# Patient Record
Sex: MALE | Race: WHITE | Employment: FULL TIME | URBAN - METROPOLITAN AREA
[De-identification: names, ages, dates, MRNs, and addresses within clinical notes are randomized per-mention and may not be internally consistent; named-entity substitution may affect disease eponyms.]

---

## 2018-12-28 ENCOUNTER — HOSPITAL ENCOUNTER (INPATIENT)
Age: 38
LOS: 1 days | Discharge: HOME | DRG: 247 | End: 2018-12-29
Attending: EMERGENCY MEDICINE | Admitting: INTERNAL MEDICINE
Payer: SUBSIDIZED

## 2018-12-28 ENCOUNTER — APPOINTMENT (OUTPATIENT)
Dept: GENERAL RADIOLOGY | Age: 38
DRG: 247 | End: 2018-12-28
Attending: EMERGENCY MEDICINE
Payer: SUBSIDIZED

## 2018-12-28 DIAGNOSIS — R07.9 ACUTE CHEST PAIN: Primary | ICD-10-CM

## 2018-12-28 PROBLEM — I21.4 NSTEMI (NON-ST ELEVATED MYOCARDIAL INFARCTION) (HCC): Status: ACTIVE | Noted: 2018-12-28

## 2018-12-28 PROBLEM — I10 HTN (HYPERTENSION): Status: ACTIVE | Noted: 2018-12-28

## 2018-12-28 PROBLEM — F19.20 SUBSTANCE DEPENDENCY (HCC): Status: ACTIVE | Noted: 2018-12-28

## 2018-12-28 LAB
ACT BLD: 230 SECS (ref 70–128)
ACT BLD: 257 SECS (ref 70–128)
ALBUMIN SERPL-MCNC: 3.9 G/DL (ref 3.5–5)
ALBUMIN/GLOB SERPL: 1 {RATIO} (ref 1.2–3.5)
ALP SERPL-CCNC: 69 U/L (ref 50–136)
ALT SERPL-CCNC: 33 U/L (ref 12–65)
AMPHET UR QL SCN: NEGATIVE
ANION GAP SERPL CALC-SCNC: 9 MMOL/L (ref 7–16)
APTT PPP: 27 SEC (ref 24.7–39.8)
AST SERPL-CCNC: 26 U/L (ref 15–37)
ATRIAL RATE: 67 BPM
ATRIAL RATE: 83 BPM
BARBITURATES UR QL SCN: NEGATIVE
BASOPHILS # BLD: 0 K/UL (ref 0–0.2)
BASOPHILS NFR BLD: 1 % (ref 0–2)
BENZODIAZ UR QL: POSITIVE
BILIRUB SERPL-MCNC: 0.5 MG/DL (ref 0.2–1.1)
BUN SERPL-MCNC: 13 MG/DL (ref 6–23)
CALCIUM SERPL-MCNC: 8.8 MG/DL (ref 8.3–10.4)
CALCULATED P AXIS, ECG09: 50 DEGREES
CALCULATED P AXIS, ECG09: 63 DEGREES
CALCULATED R AXIS, ECG10: 25 DEGREES
CALCULATED R AXIS, ECG10: 26 DEGREES
CALCULATED T AXIS, ECG11: 22 DEGREES
CALCULATED T AXIS, ECG11: 43 DEGREES
CANNABINOIDS UR QL SCN: POSITIVE
CHLORIDE SERPL-SCNC: 109 MMOL/L (ref 98–107)
CO2 SERPL-SCNC: 26 MMOL/L (ref 21–32)
COCAINE UR QL SCN: NEGATIVE
CREAT SERPL-MCNC: 0.94 MG/DL (ref 0.8–1.5)
DIAGNOSIS, 93000: NORMAL
DIAGNOSIS, 93000: NORMAL
DIFFERENTIAL METHOD BLD: ABNORMAL
EOSINOPHIL # BLD: 0.1 K/UL (ref 0–0.8)
EOSINOPHIL NFR BLD: 1 % (ref 0.5–7.8)
ERYTHROCYTE [DISTWIDTH] IN BLOOD BY AUTOMATED COUNT: 11.9 % (ref 11.9–14.6)
GLOBULIN SER CALC-MCNC: 3.8 G/DL (ref 2.3–3.5)
GLUCOSE SERPL-MCNC: 108 MG/DL (ref 65–100)
HCT VFR BLD AUTO: 49.5 % (ref 41.1–50.3)
HGB BLD-MCNC: 16.7 G/DL (ref 13.6–17.2)
IMM GRANULOCYTES # BLD: 0 K/UL (ref 0–0.5)
IMM GRANULOCYTES NFR BLD AUTO: 1 % (ref 0–5)
LYMPHOCYTES # BLD: 3.8 K/UL (ref 0.5–4.6)
LYMPHOCYTES NFR BLD: 43 % (ref 13–44)
MCH RBC QN AUTO: 31.7 PG (ref 26.1–32.9)
MCHC RBC AUTO-ENTMCNC: 33.7 G/DL (ref 31.4–35)
MCV RBC AUTO: 94.1 FL (ref 79.6–97.8)
METHADONE UR QL: NEGATIVE
MONOCYTES # BLD: 0.8 K/UL (ref 0.1–1.3)
MONOCYTES NFR BLD: 9 % (ref 4–12)
NEUTS SEG # BLD: 4 K/UL (ref 1.7–8.2)
NEUTS SEG NFR BLD: 46 % (ref 43–78)
NRBC # BLD: 0 K/UL (ref 0–0.2)
OPIATES UR QL: POSITIVE
P-R INTERVAL, ECG05: 154 MS
P-R INTERVAL, ECG05: 158 MS
PCP UR QL: NEGATIVE
PLATELET # BLD AUTO: 202 K/UL (ref 150–450)
PMV BLD AUTO: 8.8 FL (ref 9.4–12.3)
POTASSIUM SERPL-SCNC: 3.3 MMOL/L (ref 3.5–5.1)
PROT SERPL-MCNC: 7.7 G/DL (ref 6.3–8.2)
Q-T INTERVAL, ECG07: 356 MS
Q-T INTERVAL, ECG07: 374 MS
QRS DURATION, ECG06: 86 MS
QRS DURATION, ECG06: 88 MS
QTC CALCULATION (BEZET), ECG08: 395 MS
QTC CALCULATION (BEZET), ECG08: 418 MS
RBC # BLD AUTO: 5.26 M/UL (ref 4.23–5.6)
SODIUM SERPL-SCNC: 144 MMOL/L (ref 136–145)
TROPONIN I BLD-MCNC: 0.8 NG/ML (ref 0.02–0.05)
TROPONIN I SERPL-MCNC: 1.75 NG/ML (ref 0.02–0.05)
TROPONIN I SERPL-MCNC: 5.13 NG/ML (ref 0.02–0.05)
VENTRICULAR RATE, ECG03: 67 BPM
VENTRICULAR RATE, ECG03: 83 BPM
WBC # BLD AUTO: 8.7 K/UL (ref 4.3–11.1)

## 2018-12-28 PROCEDURE — 74011250637 HC RX REV CODE- 250/637: Performed by: INTERNAL MEDICINE

## 2018-12-28 PROCEDURE — 74011250637 HC RX REV CODE- 250/637: Performed by: EMERGENCY MEDICINE

## 2018-12-28 PROCEDURE — 71045 X-RAY EXAM CHEST 1 VIEW: CPT

## 2018-12-28 PROCEDURE — 84484 ASSAY OF TROPONIN QUANT: CPT

## 2018-12-28 PROCEDURE — 80307 DRUG TEST PRSMV CHEM ANLYZR: CPT

## 2018-12-28 PROCEDURE — 027034Z DILATION OF CORONARY ARTERY, ONE ARTERY WITH DRUG-ELUTING INTRALUMINAL DEVICE, PERCUTANEOUS APPROACH: ICD-10-PCS | Performed by: INTERNAL MEDICINE

## 2018-12-28 PROCEDURE — 92928 PRQ TCAT PLMT NTRAC ST 1 LES: CPT

## 2018-12-28 PROCEDURE — 74011250636 HC RX REV CODE- 250/636: Performed by: NURSE PRACTITIONER

## 2018-12-28 PROCEDURE — 65660000000 HC RM CCU STEPDOWN

## 2018-12-28 PROCEDURE — 85025 COMPLETE CBC W/AUTO DIFF WBC: CPT

## 2018-12-28 PROCEDURE — B2101ZZ FLUOROSCOPY OF SINGLE CORONARY ARTERY USING LOW OSMOLAR CONTRAST: ICD-10-PCS | Performed by: INTERNAL MEDICINE

## 2018-12-28 PROCEDURE — 93005 ELECTROCARDIOGRAM TRACING: CPT | Performed by: INTERNAL MEDICINE

## 2018-12-28 PROCEDURE — 92978 ENDOLUMINL IVUS OCT C 1ST: CPT

## 2018-12-28 PROCEDURE — 93458 L HRT ARTERY/VENTRICLE ANGIO: CPT

## 2018-12-28 PROCEDURE — 74011000250 HC RX REV CODE- 250: Performed by: INTERNAL MEDICINE

## 2018-12-28 PROCEDURE — C8929 TTE W OR WO FOL WCON,DOPPLER: HCPCS

## 2018-12-28 PROCEDURE — 80053 COMPREHEN METABOLIC PANEL: CPT

## 2018-12-28 PROCEDURE — 74011636320 HC RX REV CODE- 636/320: Performed by: INTERNAL MEDICINE

## 2018-12-28 PROCEDURE — 85347 COAGULATION TIME ACTIVATED: CPT

## 2018-12-28 PROCEDURE — 36415 COLL VENOUS BLD VENIPUNCTURE: CPT

## 2018-12-28 PROCEDURE — 99285 EMERGENCY DEPT VISIT HI MDM: CPT | Performed by: EMERGENCY MEDICINE

## 2018-12-28 PROCEDURE — 99153 MOD SED SAME PHYS/QHP EA: CPT

## 2018-12-28 PROCEDURE — 93005 ELECTROCARDIOGRAM TRACING: CPT | Performed by: EMERGENCY MEDICINE

## 2018-12-28 PROCEDURE — 74011250637 HC RX REV CODE- 250/637: Performed by: NURSE PRACTITIONER

## 2018-12-28 PROCEDURE — 85730 THROMBOPLASTIN TIME PARTIAL: CPT

## 2018-12-28 PROCEDURE — 74011250636 HC RX REV CODE- 250/636: Performed by: INTERNAL MEDICINE

## 2018-12-28 PROCEDURE — 4A023N7 MEASUREMENT OF CARDIAC SAMPLING AND PRESSURE, LEFT HEART, PERCUTANEOUS APPROACH: ICD-10-PCS | Performed by: INTERNAL MEDICINE

## 2018-12-28 PROCEDURE — 99152 MOD SED SAME PHYS/QHP 5/>YRS: CPT

## 2018-12-28 PROCEDURE — B240ZZ3 ULTRASONOGRAPHY OF SINGLE CORONARY ARTERY, INTRAVASCULAR: ICD-10-PCS | Performed by: INTERNAL MEDICINE

## 2018-12-28 PROCEDURE — 74011250636 HC RX REV CODE- 250/636

## 2018-12-28 PROCEDURE — B2151ZZ FLUOROSCOPY OF LEFT HEART USING LOW OSMOLAR CONTRAST: ICD-10-PCS | Performed by: INTERNAL MEDICINE

## 2018-12-28 RX ORDER — MORPHINE SULFATE 2 MG/ML
2 INJECTION, SOLUTION INTRAMUSCULAR; INTRAVENOUS
Status: DISCONTINUED | OUTPATIENT
Start: 2018-12-28 | End: 2018-12-29 | Stop reason: HOSPADM

## 2018-12-28 RX ORDER — MIDAZOLAM HYDROCHLORIDE 1 MG/ML
.5-2 INJECTION, SOLUTION INTRAMUSCULAR; INTRAVENOUS
Status: DISCONTINUED | OUTPATIENT
Start: 2018-12-28 | End: 2018-12-29 | Stop reason: HOSPADM

## 2018-12-28 RX ORDER — SODIUM CHLORIDE 9 MG/ML
100 INJECTION, SOLUTION INTRAVENOUS CONTINUOUS
Status: DISCONTINUED | OUTPATIENT
Start: 2018-12-28 | End: 2018-12-29 | Stop reason: HOSPADM

## 2018-12-28 RX ORDER — ACETAMINOPHEN 500 MG
1000 TABLET ORAL
Status: COMPLETED | OUTPATIENT
Start: 2018-12-28 | End: 2018-12-28

## 2018-12-28 RX ORDER — LISINOPRIL 5 MG/1
2.5 TABLET ORAL DAILY
Status: DISCONTINUED | OUTPATIENT
Start: 2018-12-28 | End: 2018-12-29 | Stop reason: HOSPADM

## 2018-12-28 RX ORDER — GUAIFENESIN 100 MG/5ML
81 LIQUID (ML) ORAL DAILY
Status: DISCONTINUED | OUTPATIENT
Start: 2018-12-29 | End: 2018-12-29 | Stop reason: HOSPADM

## 2018-12-28 RX ORDER — HEPARIN SODIUM 200 [USP'U]/100ML
2 INJECTION, SOLUTION INTRAVENOUS CONTINUOUS
Status: DISCONTINUED | OUTPATIENT
Start: 2018-12-28 | End: 2018-12-29 | Stop reason: HOSPADM

## 2018-12-28 RX ORDER — HEPARIN SODIUM 5000 [USP'U]/ML
4000 INJECTION, SOLUTION INTRAVENOUS; SUBCUTANEOUS ONCE
Status: COMPLETED | OUTPATIENT
Start: 2018-12-28 | End: 2018-12-28

## 2018-12-28 RX ORDER — HEPARIN SODIUM 10000 [USP'U]/ML
1000-9000 INJECTION, SOLUTION INTRAVENOUS; SUBCUTANEOUS
Status: DISCONTINUED | OUTPATIENT
Start: 2018-12-28 | End: 2018-12-29 | Stop reason: HOSPADM

## 2018-12-28 RX ORDER — FENTANYL CITRATE 50 UG/ML
25-100 INJECTION, SOLUTION INTRAMUSCULAR; INTRAVENOUS
Status: DISCONTINUED | OUTPATIENT
Start: 2018-12-28 | End: 2018-12-29 | Stop reason: HOSPADM

## 2018-12-28 RX ORDER — HEPARIN SODIUM 5000 [USP'U]/100ML
12-25 INJECTION, SOLUTION INTRAVENOUS
Status: DISCONTINUED | OUTPATIENT
Start: 2018-12-28 | End: 2018-12-29 | Stop reason: HOSPADM

## 2018-12-28 RX ORDER — ACETAMINOPHEN 325 MG/1
650 TABLET ORAL
Status: DISCONTINUED | OUTPATIENT
Start: 2018-12-28 | End: 2018-12-29 | Stop reason: HOSPADM

## 2018-12-28 RX ORDER — LIDOCAINE HYDROCHLORIDE 10 MG/ML
3-10 INJECTION INFILTRATION; PERINEURAL
Status: DISCONTINUED | OUTPATIENT
Start: 2018-12-28 | End: 2018-12-29 | Stop reason: HOSPADM

## 2018-12-28 RX ORDER — SODIUM CHLORIDE 0.9 % (FLUSH) 0.9 %
5-10 SYRINGE (ML) INJECTION AS NEEDED
Status: DISCONTINUED | OUTPATIENT
Start: 2018-12-28 | End: 2018-12-29 | Stop reason: HOSPADM

## 2018-12-28 RX ORDER — NITROGLYCERIN 0.4 MG/1
0.4 TABLET SUBLINGUAL
Status: DISCONTINUED | OUTPATIENT
Start: 2018-12-28 | End: 2018-12-28

## 2018-12-28 RX ORDER — ATORVASTATIN CALCIUM 40 MG/1
40 TABLET, FILM COATED ORAL
Status: DISCONTINUED | OUTPATIENT
Start: 2018-12-28 | End: 2018-12-29 | Stop reason: HOSPADM

## 2018-12-28 RX ORDER — HYDROMORPHONE HYDROCHLORIDE 2 MG/ML
1 INJECTION, SOLUTION INTRAMUSCULAR; INTRAVENOUS; SUBCUTANEOUS ONCE
Status: COMPLETED | OUTPATIENT
Start: 2018-12-28 | End: 2018-12-28

## 2018-12-28 RX ORDER — HYDROCODONE BITARTRATE AND ACETAMINOPHEN 5; 325 MG/1; MG/1
1 TABLET ORAL
Status: DISCONTINUED | OUTPATIENT
Start: 2018-12-28 | End: 2018-12-29 | Stop reason: HOSPADM

## 2018-12-28 RX ORDER — ASPIRIN 325 MG
325 TABLET ORAL
Status: COMPLETED | OUTPATIENT
Start: 2018-12-28 | End: 2018-12-28

## 2018-12-28 RX ORDER — NITROGLYCERIN 0.4 MG/1
0.4 TABLET SUBLINGUAL
Status: DISCONTINUED | OUTPATIENT
Start: 2018-12-28 | End: 2018-12-29 | Stop reason: HOSPADM

## 2018-12-28 RX ADMIN — HEPARIN SODIUM AND DEXTROSE 12 UNITS/KG/HR: 5000; 5 INJECTION INTRAVENOUS at 10:20

## 2018-12-28 RX ADMIN — HEPARIN SODIUM 2000 UNITS: 10000 INJECTION INTRAVENOUS; SUBCUTANEOUS at 15:29

## 2018-12-28 RX ADMIN — ASPIRIN 325 MG: 325 TABLET ORAL at 09:03

## 2018-12-28 RX ADMIN — TICAGRELOR 180 MG: 90 TABLET ORAL at 15:36

## 2018-12-28 RX ADMIN — SODIUM CHLORIDE 100 ML/HR: 900 INJECTION, SOLUTION INTRAVENOUS at 12:14

## 2018-12-28 RX ADMIN — IOPAMIDOL 230 ML: 755 INJECTION, SOLUTION INTRAVENOUS at 15:36

## 2018-12-28 RX ADMIN — TIROFIBAN 2925 MCG: 3.75 INJECTION, SOLUTION INTRAVENOUS at 14:49

## 2018-12-28 RX ADMIN — HEPARIN SODIUM 4000 UNITS: 10000 INJECTION INTRAVENOUS; SUBCUTANEOUS at 14:46

## 2018-12-28 RX ADMIN — LISINOPRIL 2.5 MG: 5 TABLET ORAL at 12:14

## 2018-12-28 RX ADMIN — NITROGLYCERIN 0.4 MG: 0.4 TABLET, ORALLY DISINTEGRATING SUBLINGUAL at 09:28

## 2018-12-28 RX ADMIN — ACETAMINOPHEN 650 MG: 325 TABLET ORAL at 23:35

## 2018-12-28 RX ADMIN — MIDAZOLAM HYDROCHLORIDE 2 MG: 1 INJECTION, SOLUTION INTRAMUSCULAR; INTRAVENOUS at 14:37

## 2018-12-28 RX ADMIN — HYDROMORPHONE HYDROCHLORIDE 1 MG: 2 INJECTION, SOLUTION INTRAMUSCULAR; INTRAVENOUS; SUBCUTANEOUS at 15:19

## 2018-12-28 RX ADMIN — ATORVASTATIN CALCIUM 40 MG: 40 TABLET, FILM COATED ORAL at 21:29

## 2018-12-28 RX ADMIN — MIDAZOLAM HYDROCHLORIDE 2 MG: 1 INJECTION, SOLUTION INTRAMUSCULAR; INTRAVENOUS at 14:41

## 2018-12-28 RX ADMIN — ACETAMINOPHEN 1000 MG: 500 TABLET, FILM COATED ORAL at 09:33

## 2018-12-28 RX ADMIN — HEPARIN SODIUM 2 UNITS/HR: 5000 INJECTION, SOLUTION INTRAVENOUS; SUBCUTANEOUS at 14:16

## 2018-12-28 RX ADMIN — TIROFIBAN 0.15 MCG/KG/MIN: 5 INJECTION, SOLUTION INTRAVENOUS at 14:50

## 2018-12-28 RX ADMIN — HEPARIN SODIUM 4000 UNITS: 5000 INJECTION INTRAVENOUS; SUBCUTANEOUS at 10:19

## 2018-12-28 RX ADMIN — MIDAZOLAM HYDROCHLORIDE 2 MG: 1 INJECTION, SOLUTION INTRAMUSCULAR; INTRAVENOUS at 14:56

## 2018-12-28 RX ADMIN — HEPARIN SODIUM 2 ML: 10000 INJECTION INTRAVENOUS; SUBCUTANEOUS at 14:41

## 2018-12-28 RX ADMIN — MIDAZOLAM HYDROCHLORIDE 2 MG: 1 INJECTION, SOLUTION INTRAMUSCULAR; INTRAVENOUS at 15:13

## 2018-12-28 RX ADMIN — NITROGLYCERIN 1 INCH: 20 OINTMENT TOPICAL at 12:14

## 2018-12-28 RX ADMIN — LIDOCAINE HYDROCHLORIDE 4 ML: 10 INJECTION, SOLUTION INFILTRATION; PERINEURAL at 14:41

## 2018-12-28 RX ADMIN — NITROGLYCERIN 0.4 MG: 0.4 TABLET, ORALLY DISINTEGRATING SUBLINGUAL at 09:03

## 2018-12-28 RX ADMIN — FENTANYL CITRATE 50 MCG: 50 INJECTION, SOLUTION INTRAMUSCULAR; INTRAVENOUS at 15:17

## 2018-12-28 RX ADMIN — PERFLUTREN 1 ML: 6.52 INJECTION, SUSPENSION INTRAVENOUS at 12:00

## 2018-12-28 RX ADMIN — HEPARIN SODIUM 3000 UNITS: 10000 INJECTION INTRAVENOUS; SUBCUTANEOUS at 15:02

## 2018-12-28 RX ADMIN — FENTANYL CITRATE 50 MCG: 50 INJECTION, SOLUTION INTRAMUSCULAR; INTRAVENOUS at 14:37

## 2018-12-28 NOTE — PROGRESS NOTES
TRANSFER - OUT REPORT: 
 
Verbal report given to [facility] on Lobo Kunz  being transferred to [facility] for routine post - op Report consisted of patients Situation, Background, Assessment and  
Recommendations(SBAR). Information from the following report(s) SBAR, Procedure Summary, Intake/Output, MAR and Cardiac Rhythm NSR was reviewed with the receiving nurse. Lines:  
Peripheral IV 12/28/18 Left Antecubital (Active) Site Assessment Clean, dry, & intact; Clean;Dry 12/28/2018  3:49 PM  
Phlebitis Assessment 0 12/28/2018  3:49 PM  
Infiltration Assessment 0 12/28/2018  3:49 PM  
Dressing Status Clean, dry, & intact; Clean;Dry 12/28/2018  3:49 PM  
Dressing Type Tape;Transparent 12/28/2018  3:49 PM  
Hub Color/Line Status Patent; Infusing;Green 12/28/2018  3:49 PM  
  
 
Opportunity for questions and clarification was provided. Patient transported with: 
 Monitor Registered Nurse

## 2018-12-28 NOTE — ED PROVIDER NOTES
Patient complains of chest pain. Began yesterday and lasted 15 minutes. Recurred on awakening this am. Some nausea with no vomiting. Some SOB. No cough. No fever. Pressure type pain. Had a BM this am after pain began. No prior history of heart disease. No history of lung disease. Smokes pot daily. No cigarettes. Grandparents with heart disease. Does not get any regular medical care. Was treated for hypertension while incarcerated several years ago. No past medical history on file. No past surgical history on file. No family history on file. Social History Socioeconomic History  Marital status: SINGLE Spouse name: Not on file  Number of children: Not on file  Years of education: Not on file  Highest education level: Not on file Social Needs  Financial resource strain: Not on file  Food insecurity - worry: Not on file  Food insecurity - inability: Not on file  Transportation needs - medical: Not on file  Transportation needs - non-medical: Not on file Occupational History  Not on file Tobacco Use  Smoking status: Current Every Day Smoker  Tobacco comment: black and mild Substance and Sexual Activity  Alcohol use: No  
 Drug use: No  
 Sexual activity: Not Currently Other Topics Concern  Not on file Social History Narrative  Not on file ALLERGIES: Patient has no known allergies. Review of Systems Constitutional: Negative. HENT: Negative. Eyes: Negative. Respiratory: Positive for cough (chronic attributed to smoking). Cardiovascular: Positive for chest pain. Negative for palpitations and leg swelling. Gastrointestinal: Positive for nausea. Negative for abdominal pain, diarrhea and vomiting. Genitourinary: Negative. Musculoskeletal: Positive for back pain. Skin: Negative. Neurological: Negative. Hematological: Negative. Psychiatric/Behavioral: Negative. Vitals: 12/28/18 0825 12/28/18 3243 BP: 139/79 134/82 Pulse: 95 80 Resp: 18 Temp: 97.8 °F (36.6 °C) SpO2: 100% Weight: 120.2 kg (265 lb) Height: 5' 11\" (1.803 m) Physical Exam  
Constitutional: He is oriented to person, place, and time. He appears well-developed and well-nourished. HENT:  
Head: Normocephalic and atraumatic. Eyes: EOM are normal. Pupils are equal, round, and reactive to light. Neck: Normal range of motion. Neck supple. No JVD present. Cardiovascular: Normal rate, regular rhythm, intact distal pulses and normal pulses. Pulmonary/Chest: Effort normal and breath sounds normal.  
Abdominal: Soft. Bowel sounds are normal. There is no tenderness. Musculoskeletal: Normal range of motion. Right lower leg: He exhibits no edema. Neurological: He is alert and oriented to person, place, and time. Skin: Skin is warm and dry. Capillary refill takes less than 2 seconds. Psychiatric: His mood appears anxious. Nursing note and vitals reviewed. MDM Number of Diagnoses or Management Options Acute chest pain:  
Diagnosis management comments: Chest pain Troponin elevated Labs reviewed EKG sinus no ischemia CXR clear Aspirin 325 mg po, nitroglycerin 0.4 mg SL - pain improved. Second NTG for continued pain. Consult Cardiology - Dr. Brent Swartz - will see Amount and/or Complexity of Data Reviewed Clinical lab tests: ordered and reviewed Tests in the radiology section of CPT®: ordered and reviewed Decide to obtain previous medical records or to obtain history from someone other than the patient: yes Obtain history from someone other than the patient: yes (aunt) Review and summarize past medical records: yes Discuss the patient with other providers: yes Independent visualization of images, tracings, or specimens: yes Risk of Complications, Morbidity, and/or Mortality General comments: Critical care time 40 minutes Critical Care Total time providing critical care: 30-74 minutes Patient Progress Patient progress: stable Procedures

## 2018-12-28 NOTE — PROCEDURES
Brief Cardiac Procedure Note    Patient: Juanito Mcarthur MRN: [id number]  SSN: [id number]    YOB: 1980  Age: 41 y.o. Sex: male      Date of Procedure: 12/28/2018     Pre-procedure Diagnosis: NSTEMI    Post-procedure Diagnosis: Coronary artery disease    Procedure: Left Heart Catheterization with PCI    Brief Description of Procedure: As above    Performed By: Shay Boyd MD     Assistants: None    Anesthesia: Moderate Sedation    Estimated Blood Loss: Less than 10 mL      Specimens: None    Implants: None    Findings: Obstructive CAD. PCI of pLAD with Synergy 4 x 24 mm FAMILIA. Post dilated to 4.5 and 5. Post PCI IVUS. Complications: None    Recommendations: Continue medical therapy.     Signed By: Shay Boyd MD     December 28, 2018

## 2018-12-28 NOTE — PROGRESS NOTES
Patient to tele room via stretcher from Cath lab post [facility]. Patient transferred to bed via sheet slide. Telebox reapplied and [place] at bedside with BP cycling every 15 minutes. Gtts running: NS at continued at 75 ml/hr. TR band in place. R radial site without hematoma and without bleeding. Site clean, dry, and intact. Able to remove air 1730. Patient voiced understanding of keeping wrist immobilized. Instructed patient to not use arm for any pushing or pulling. Patient aware to use call light to communicate needs which is in reach of the patient. Patient verbalizes understanding. Aggrastat to run x 12 hours. End at 3AM.

## 2018-12-28 NOTE — PROGRESS NOTES
Report received from Yoko Verdugo. Procedural findings communicated. Intra procedural  medication administration reviewed. Progression of care discussed. Patient received into [address] [facility] 7 post sheath removal.  
 
Access site without bleeding or swelling yes Dressing dry and intact yes Patient instructed to limit movement to right upper  extremity Routine post procedural vital signs and site assessment initiated yes

## 2018-12-28 NOTE — H&P
[address] Cardiology History & Physical  
  
Date of  Admission: 12/28/2018  8:23 AM  
 
Primary Care Physician:  None Primary Cardiologist:  None Admitting Physician:  Dr. Phylis Dakins CC:  Chest pain HPI:  Kathryn Kelly is a 41 y.o. male with PMH of HTN (treated while incarcerated, but takes no medications) and daily marijuana use, who presents with chest pain. The first episode occurred yesterday, with general aching of arms and pressure in chest.  He sat down to rest and the pain subsided after about 45 minutes. The chest pain woke him up this morning. The pain was again substernal with radiation to both arms and did not resolve. He reports chest pain became worse with walking in to the ED and he exprienced shortness of breath and nausea. His chest is also tender to palpation. He was treated with nitro 0.4 mg SL and pain has now resolved. EKG shows NSR. BP was 139/79 and HR 95 on arrival.  Labs show WBCs 8.7, H&h 16.7/49.5, plt 202, Na 144, K 3.3, BUN 13, and creatinine 0.94. Initial troponin is 0.8. CXR is negative. Denies history of CAD, CHF or arrhythmias. Does have + family history of CAD. Denies any substance abuse other that marijuana. Past Medical History:  
Diagnosis Date  
 HTN (hypertension) 12/28/2018 No past surgical history on file. No Known Allergies Social History Socioeconomic History  Marital status: SINGLE Spouse name: Not on file  Number of children: Not on file  Years of education: Not on file  Highest education level: Not on file Social Needs  Financial resource strain: Not on file  Food insecurity - worry: Not on file  Food insecurity - inability: Not on file  Transportation needs - medical: Not on file  Transportation needs - non-medical: Not on file Occupational History  Not on file Tobacco Use  Smoking status: Current Every Day Smoker  Tobacco comment: black and mild Substance and Sexual Activity  Alcohol use: No  
 Drug use: No  
 Sexual activity: Not Currently Other Topics Concern  Not on file Social History Narrative  Not on file No family history on file. Current Facility-Administered Medications Medication Dose Route Frequency  nitroglycerin (NITROSTAT) tablet 0.4 mg  0.4 mg SubLINGual Q5MIN PRN Current Outpatient Medications Medication Sig  
 neomycin-polymyxin-hydrocortisone, buffered, (CORTISPORIN) 3.5-10,000-1 mg-unit/mL-% otic suspension Administer 3 Drops in left ear four (4) times daily. Review of Systems Review of Systems Constitution: Positive for diaphoresis. HENT: Negative. Eyes: Negative. Cardiovascular: Positive for chest pain. Respiratory: Positive for shortness of breath. Endocrine: Negative. Skin: Negative. Musculoskeletal: Negative. Gastrointestinal: Positive for nausea. Genitourinary: Negative. Neurological: Negative. Psychiatric/Behavioral: Positive for substance abuse. Allergic/Immunologic: Negative. Subjective:  
 
Visit Vitals /67 Pulse 81 Temp 97.8 °F (36.6 °C) Resp 18 Ht 5' 11\" (1.803 m) Wt 120.2 kg (265 lb) SpO2 98% BMI 36.96 kg/m² Physical Exam  
Constitutional: He is oriented to person, place, and time and well-developed, well-nourished, and in no distress. HENT:  
Head: Normocephalic. Eyes: Pupils are equal, round, and reactive to light. Neck: Normal range of motion. Cardiovascular: Normal rate and regular rhythm. Pulmonary/Chest: Effort normal and breath sounds normal.  
Abdominal: Soft. Bowel sounds are normal.  
Musculoskeletal: Normal range of motion. Neurological: He is alert and oriented to person, place, and time. Skin: Skin is warm and dry. Psychiatric: Mood, memory, affect and judgment normal.  
 
 
Cardiographics Telemetry: normal sinus rhythm ECG: normal sinus rhythm Labs: Recent Results (from the past 24 hour(s)) EKG, 12 LEAD, INITIAL Collection Time: 12/28/18  8:28 AM  
Result Value Ref Range Ventricular Rate 83 BPM  
 Atrial Rate 83 BPM  
 P-R Interval 158 ms QRS Duration 88 ms Q-T Interval 356 ms  
 QTC Calculation (Bezet) 418 ms Calculated P Axis 63 degrees Calculated R Axis 25 degrees Calculated T Axis 22 degrees Diagnosis    
  !! AGE AND GENDER SPECIFIC ECG ANALYSIS !! Normal sinus rhythm Normal ECG No previous ECGs available CBC WITH AUTOMATED DIFF Collection Time: 12/28/18  8:32 AM  
Result Value Ref Range WBC 8.7 4.3 - 11.1 K/uL  
 RBC 5.26 4.23 - 5.6 M/uL  
 HGB 16.7 13.6 - 17.2 g/dL HCT 49.5 41.1 - 50.3 % MCV 94.1 79.6 - 97.8 FL  
 MCH 31.7 26.1 - 32.9 PG  
 MCHC 33.7 31.4 - 35.0 g/dL  
 RDW 11.9 11.9 - 14.6 % PLATELET 160 490 - 149 K/uL MPV 8.8 (L) 9.4 - 12.3 FL ABSOLUTE NRBC 0.00 0.0 - 0.2 K/uL  
 DF AUTOMATED NEUTROPHILS 46 43 - 78 % LYMPHOCYTES 43 13 - 44 % MONOCYTES 9 4.0 - 12.0 % EOSINOPHILS 1 0.5 - 7.8 % BASOPHILS 1 0.0 - 2.0 % IMMATURE GRANULOCYTES 1 0.0 - 5.0 %  
 ABS. NEUTROPHILS 4.0 1.7 - 8.2 K/UL  
 ABS. LYMPHOCYTES 3.8 0.5 - 4.6 K/UL  
 ABS. MONOCYTES 0.8 0.1 - 1.3 K/UL  
 ABS. EOSINOPHILS 0.1 0.0 - 0.8 K/UL  
 ABS. BASOPHILS 0.0 0.0 - 0.2 K/UL  
 ABS. IMM. GRANS. 0.0 0.0 - 0.5 K/UL METABOLIC PANEL, COMPREHENSIVE Collection Time: 12/28/18  8:32 AM  
Result Value Ref Range Sodium 144 136 - 145 mmol/L Potassium 3.3 (L) 3.5 - 5.1 mmol/L Chloride 109 (H) 98 - 107 mmol/L  
 CO2 26 21 - 32 mmol/L Anion gap 9 7 - 16 mmol/L Glucose 108 (H) 65 - 100 mg/dL BUN 13 6 - 23 MG/DL Creatinine 0.94 0.8 - 1.5 MG/DL  
 GFR est AA >60 >60 ml/min/1.73m2 GFR est non-AA >60 >60 ml/min/1.73m2 Calcium 8.8 8.3 - 10.4 MG/DL Bilirubin, total 0.5 0.2 - 1.1 MG/DL  
 ALT (SGPT) 33 12 - 65 U/L  
 AST (SGOT) 26 15 - 37 U/L Alk.  phosphatase 69 50 - 136 U/L  
 Protein, total 7.7 6.3 - 8.2 g/dL Albumin 3.9 3.5 - 5.0 g/dL Globulin 3.8 (H) 2.3 - 3.5 g/dL A-G Ratio 1.0 (L) 1.2 - 3.5 POC TROPONIN-I Collection Time: 12/28/18  8:34 AM  
Result Value Ref Range Troponin-I (POC) 0.8 (H) 0.02 - 0.05 ng/ml Patient has been seen and examined by Dr. Vi Palomino and he agrees with the following assessment and plan: 
 
 Assessment/Plan:  
  
 Principal Problem: 
  NSTEMI (non-ST elevated myocardial infarction) -- admit patient. Heparin bolus and start drip. NS @ 100cc/hr. Nitropaste 1 inch to chest.  mg given in ER. Start ACE and statin. Will wait to see UDS results before starting BB. Plan for [facility] today. Active Problems: 
  HTN (hypertension) -- start lisinopril 2.5 mg Substance dependency -- cessation needed.   
 
 
 
 
Bety Sanders NP 
12/28/2018 9:47 AM

## 2018-12-28 NOTE — ROUTINE PROCESS
TRANSFER - OUT REPORT: 
 
Verbal report given to Cheikh Alfaro RN on [address]  being transferred to [facility] for routine progression of care Report consisted of patients Situation, Background, Assessment and  
Recommendations(SBAR). Information from the following report(s) ED Summary was reviewed with the receiving nurse. Lines:  
Peripheral IV 12/28/18 Left Antecubital (Active) Site Assessment Clean, dry, & intact 12/28/2018 10:19 AM  
Phlebitis Assessment 0 12/28/2018 10:19 AM  
Infiltration Assessment 0 12/28/2018 10:19 AM  
Dressing Status Clean, dry, & intact 12/28/2018 10:19 AM  
  
 
Opportunity for questions and clarification was provided. Patient transported with: 
 Monitor

## 2018-12-28 NOTE — PROGRESS NOTES
TRANSFER - IN REPORT: 
 
Verbal report received from Kelvin Puente RN on Jayce [place] being received from Cath lab for routine progression of care Report consisted of patients Situation, Background, Assessment and Recommendations(SBAR). Information from the following report(s) SBAR, Kardex and Procedure Summary was reviewed with the receiving nurse. Opportunity for questions and clarification was provided. Assessment completed upon patients arrival to unit and care assumed.

## 2018-12-28 NOTE — PROGRESS NOTES
TRANSFER - IN REPORT: 
 
Verbal report received from JONATHAN Henriquez on Harish Gonzales being received from ER for routine progression of care Report consisted of patients Situation, Background, Assessment and Recommendations(SBAR). Information from the following report(s) SBAR, Kardex, [facility] and Recent Results was reviewed with the receiving nurse. Opportunity for questions and clarification was provided. Assessment completed upon patients arrival to unit and care assumed.

## 2018-12-28 NOTE — PROGRESS NOTES
NPO status enforced with patient. Patient educated for heart cath today and need for NPO status. Questions answered. Snacks and drinks removed from bedside. NPO sign placed on door. Patient verbalizes understanding.

## 2018-12-28 NOTE — ED NOTES
Pt being evaluated by cardiology at this time for possible admission. Pt states he is pain free after two nitro. Pt remains on monitors and waiting for further orders.

## 2018-12-28 NOTE — PROGRESS NOTES
Patient to tele room via stretcher from ER. Patient transferred to bed via ambulation after getting weight on scale. Patient placed in gown and monitor leads applied. Pt is alert and oriented x4, calm, cooperative and follows commands appropriately. Patient presently without CP, SOB, or n/v. No present complaints reported from patient. LS slightly diminished in bases. Breathing even, regular, and non-labored. SR via telemetry, without ectopy. Negative for edema in extremities. VSS. No distress noted. gtts running: Heparin at 12u Dual skin assessment with secondary RN. Skin is intact with exception to multiple tattoos. Sacrum and heels are intact. Pt repositioned in bed and turns self appropriately. Admission database complete. Patient oriented to room and floor, no questions voiced at this time. Plan of care reviewed. Patient voiced understanding to use call light to communicate needs. 12/28/18 1123 Dual Skin Pressure Injury Assessment Dual Skin Pressure Injury Assessment WDL Second Care Provider (Based on [address] [place] [address]) Rick Samaniego Skin Integumentary Skin Integumentary (WDL) X Pressure  Injury Documentation No Pressure Injury Noted-Pressure Ulcer Prevention Initiated Skin Color Appropriate for ethnicity Skin Condition/Temp Warm;Dry Skin Integrity Tattoos (comment) Turgor Epidermis thin w/ loss of subcut tissue

## 2018-12-28 NOTE — ED TRIAGE NOTES
Patient reports cp starting yesterday. Described as pressure with radiation to both arms. Denies n/v/d, cough, fever, congestion

## 2018-12-29 VITALS
DIASTOLIC BLOOD PRESSURE: 92 MMHG | SYSTOLIC BLOOD PRESSURE: 156 MMHG | RESPIRATION RATE: 19 BRPM | BODY MASS INDEX: 36.65 KG/M2 | OXYGEN SATURATION: 98 % | HEIGHT: 71 IN | TEMPERATURE: 97.5 F | WEIGHT: 261.8 LBS | HEART RATE: 89 BPM

## 2018-12-29 LAB
ANION GAP SERPL CALC-SCNC: 8 MMOL/L (ref 7–16)
BUN SERPL-MCNC: 11 MG/DL (ref 6–23)
CALCIUM SERPL-MCNC: 8.7 MG/DL (ref 8.3–10.4)
CHLORIDE SERPL-SCNC: 110 MMOL/L (ref 98–107)
CHOLEST SERPL-MCNC: 182 MG/DL
CO2 SERPL-SCNC: 23 MMOL/L (ref 21–32)
CREAT SERPL-MCNC: 0.78 MG/DL (ref 0.8–1.5)
ERYTHROCYTE [DISTWIDTH] IN BLOOD BY AUTOMATED COUNT: 12 % (ref 11.9–14.6)
GLUCOSE SERPL-MCNC: 100 MG/DL (ref 65–100)
HCT VFR BLD AUTO: 49.5 % (ref 41.1–50.3)
HDLC SERPL-MCNC: 40 MG/DL (ref 40–60)
HDLC SERPL: 4.6 {RATIO}
HGB BLD-MCNC: 17.1 G/DL (ref 13.6–17.2)
LDLC SERPL CALC-MCNC: 113.4 MG/DL
LIPID PROFILE,FLP: ABNORMAL
MCH RBC QN AUTO: 32.3 PG (ref 26.1–32.9)
MCHC RBC AUTO-ENTMCNC: 34.5 G/DL (ref 31.4–35)
MCV RBC AUTO: 93.4 FL (ref 79.6–97.8)
NRBC # BLD: 0 K/UL (ref 0–0.2)
PLATELET # BLD AUTO: 225 K/UL (ref 150–450)
PMV BLD AUTO: 9.1 FL (ref 9.4–12.3)
POTASSIUM SERPL-SCNC: 3.6 MMOL/L (ref 3.5–5.1)
RBC # BLD AUTO: 5.3 M/UL (ref 4.23–5.6)
SODIUM SERPL-SCNC: 141 MMOL/L (ref 136–145)
TRIGL SERPL-MCNC: 143 MG/DL (ref 35–150)
VLDLC SERPL CALC-MCNC: 28.6 MG/DL (ref 6–23)
WBC # BLD AUTO: 9.6 K/UL (ref 4.3–11.1)

## 2018-12-29 PROCEDURE — 80048 BASIC METABOLIC PNL TOTAL CA: CPT

## 2018-12-29 PROCEDURE — 36415 COLL VENOUS BLD VENIPUNCTURE: CPT

## 2018-12-29 PROCEDURE — 80061 LIPID PANEL: CPT

## 2018-12-29 PROCEDURE — 74011250636 HC RX REV CODE- 250/636: Performed by: INTERNAL MEDICINE

## 2018-12-29 PROCEDURE — 74011250637 HC RX REV CODE- 250/637: Performed by: NURSE PRACTITIONER

## 2018-12-29 PROCEDURE — 74011250637 HC RX REV CODE- 250/637: Performed by: PHYSICIAN ASSISTANT

## 2018-12-29 PROCEDURE — 85027 COMPLETE CBC AUTOMATED: CPT

## 2018-12-29 PROCEDURE — 74011250637 HC RX REV CODE- 250/637: Performed by: INTERNAL MEDICINE

## 2018-12-29 RX ORDER — METOPROLOL SUCCINATE 25 MG/1
25 TABLET, EXTENDED RELEASE ORAL DAILY
Qty: 30 TAB | Refills: 3 | Status: SHIPPED | OUTPATIENT
Start: 2018-12-29 | End: 2019-12-20

## 2018-12-29 RX ORDER — GUAIFENESIN 100 MG/5ML
81 LIQUID (ML) ORAL DAILY
Status: SHIPPED | COMMUNITY
Start: 2018-12-30

## 2018-12-29 RX ORDER — METOPROLOL SUCCINATE 25 MG/1
25 TABLET, EXTENDED RELEASE ORAL DAILY
Status: DISCONTINUED | OUTPATIENT
Start: 2018-12-29 | End: 2018-12-29 | Stop reason: HOSPADM

## 2018-12-29 RX ORDER — POTASSIUM CHLORIDE 20 MEQ/1
40 TABLET, EXTENDED RELEASE ORAL
Status: COMPLETED | OUTPATIENT
Start: 2018-12-29 | End: 2018-12-29

## 2018-12-29 RX ORDER — NITROGLYCERIN 0.4 MG/1
0.4 TABLET SUBLINGUAL
Qty: 25 TAB | Refills: 11 | Status: SHIPPED | OUTPATIENT
Start: 2018-12-29

## 2018-12-29 RX ORDER — ATORVASTATIN CALCIUM 40 MG/1
40 TABLET, FILM COATED ORAL
Qty: 30 TAB | Refills: 11 | Status: SHIPPED | OUTPATIENT
Start: 2018-12-29

## 2018-12-29 RX ORDER — LISINOPRIL 2.5 MG/1
2.5 TABLET ORAL DAILY
Qty: 30 TAB | Refills: 11 | Status: SHIPPED | OUTPATIENT
Start: 2018-12-30 | End: 2019-12-20

## 2018-12-29 RX ADMIN — TIROFIBAN 0.15 MCG/KG/MIN: 5 INJECTION, SOLUTION INTRAVENOUS at 01:59

## 2018-12-29 RX ADMIN — POTASSIUM CHLORIDE 40 MEQ: 20 TABLET, EXTENDED RELEASE ORAL at 08:28

## 2018-12-29 RX ADMIN — Medication 10 ML: at 05:36

## 2018-12-29 RX ADMIN — ASPIRIN 81 MG 81 MG: 81 TABLET ORAL at 08:29

## 2018-12-29 RX ADMIN — TICAGRELOR 90 MG: 90 TABLET ORAL at 05:35

## 2018-12-29 RX ADMIN — LISINOPRIL 2.5 MG: 5 TABLET ORAL at 08:28

## 2018-12-29 RX ADMIN — METOPROLOL SUCCINATE 25 MG: 25 TABLET, EXTENDED RELEASE ORAL at 13:28

## 2018-12-29 NOTE — PROGRESS NOTES
Verbal bedside report received from MONICA MUELLER [facility]. Assumed care of patient. Aggrasta IV drip verified at bedside. R radial site visualized.

## 2018-12-29 NOTE — PROGRESS NOTES
Problem: Falls - Risk of 
Goal: *Absence of Falls Document Kristy Roblero Fall Risk and appropriate interventions in the flowsheet. Outcome: Progressing Towards Goal 
Fall Risk Interventions: Pt progressing towards goal. No falls since admission. Bed low and locked. Call light within reach. Side rails x 2. Gripper socks applied. Personal belongings within reach. Pt verbalizes understanding to call for assistance. Medication Interventions: Evaluate medications/consider consulting pharmacy

## 2018-12-29 NOTE — DISCHARGE SUMMARY
[facility] Cardiology Discharge Summary     Patient ID:  Gwendolyn Baron  [id number]  79 y.o.  1980    Admit date: 12/28/2018    Discharge date:  12/29/2018    Admitting Physician: Brad Aggarwal MD     Discharge Physician: Dr. Leach Dear    Admission Diagnoses: NSTEMI (non-ST elevated myocardial infarction) [facility])    Discharge Diagnoses:   Patient Active Problem List    Diagnosis Date Noted    HTN (hypertension) 12/28/2018    Substance dependency  12/28/2018    NSTEMI (non-ST elevated myocardial infarction)  12/28/2018       Cardiology Procedures this admission:  Left heart catheterization with PCI, echo  Consults: None    HPI: Gwendolyn Baron is a 41 y.o. male with h/o HTN (treated while incarcerated, but takes no medications now) and daily marijuana use, who presented with chest pain. The first episode occurred the day prior to admission, with general aching of arms and pressure in chest. He sat down to rest and the pain subsided after about 45 minutes. The chest pain woke him up the morning of admission. The pain was again substernal with radiation to both arms and did not resolve. He reports chest pain became worse with walking in to the ED and he exprienced shortness of breath and nausea. His chest was tender to palpation. He was treated with NTG 0.4 mg SL and pain resolved. EKG showed NSR w/o acute changes. BP was 139/79 and HR 95, initial troponin was 0.8 which went up to 5. He was admitted and scheduled for a [facility]. He was taken to the cath lab by Dr. Evonne Lorenzo. Patient was found to have a stenosis of the pLAD that was stented with a 4.0 x 24 mm Synergy FAMIILA with 0% residual stenosis. Patient tolerated the procedure well and was taken to the telemetry floor for recovery. Echo showed EF 45-50 %, distal anterior and apical hypokinesis, trivial TR and MR. The following morning patient was up feeling well without any complaints of chest pain or shortness of breath.  Patient's right radial cath site was clean, dry and intact without hematoma or bruit. Patient's labs were stable. Patient was seen and examined by Dr. Fátima Yoonkeeper and determined stable and ready for discharge. Patient was instructed on the importance of medication compliance including taking Aspirin and Brilinta everyday without missing a dose. After receiving drug eluting stents, the patient will remain on dual anti-platelet therapy for 1 year. For maximized medical therapy for CAD, patient will be started on BB, ACE-I, and statin. The patient will follow up with [facility] for TC 7 appt (office will call on Monday) and has been referred to cardiac rehab. Pt has no insurance and CM was consulted to assist with affording Brilinta. Pt was given Rx for 10 days of Brilinta 90 mg BID D#20 with voucher by [facility]. Pt will have TC 7 appt and will request samples and help with filling out Brilinta assistance paperwork. DISPOSITION: The patient is being discharged home in stable condition on a low saturated fat, low cholesterol and low salt diet. The patient is instructed to advance activities as tolerated to the limit of fatigue or shortness of breath. The patient is instructed to avoid all heavy lifting, straining, stooping or squatting for 3-5 days. The patient is instructed to watch the cath site for bleeding/oozing; if seen, the patient is instructed to apply firm pressure with a clean cloth and call [facility]iology at [phone]. The patient is instructed to watch for signs of infection which include: increasing area of redness, fever/hot to touch or purulent drainage at the catheterization site. The patient is instructed not to soak in a bathtub for 7-10 days, but is cleared to shower. The patient is instructed to call the office or return to the ER for immediate evaluation for any shortness of breath or chest pain not relieved by NTG.       Discharge Exam:   Visit Vitals  /77   Pulse 84   Temp 97.8 °F (36.6 °C)   Resp 18 Ht 5' 11\" (1.803 m)   Wt 119.9 kg (264 lb 4.8 oz)   SpO2 96%   BMI 36.86 kg/m²       Patient has been seen by Dr. Lee Ann Jimenez: see his progress note for exam details. Recent Results (from the past 24 hour(s))   EKG, 12 LEAD, INITIAL    Collection Time: 12/28/18  8:28 AM   Result Value Ref Range    Ventricular Rate 83 BPM    Atrial Rate 83 BPM    P-R Interval 158 ms    QRS Duration 88 ms    Q-T Interval 356 ms    QTC Calculation (Bezet) 418 ms    Calculated P Axis 63 degrees    Calculated R Axis 25 degrees    Calculated T Axis 22 degrees    Diagnosis       !! AGE AND GENDER SPECIFIC ECG ANALYSIS !! Normal sinus rhythm  Normal ECG  No previous ECGs available  Confirmed by ST VERÓNICA BERGERON MD (), STAR WONG ([id number]) on 12/28/2018 12:17:23 PM     CBC WITH AUTOMATED DIFF    Collection Time: 12/28/18  8:32 AM   Result Value Ref Range    WBC 8.7 4.3 - 11.1 K/uL    RBC 5.26 4.23 - 5.6 M/uL    HGB 16.7 13.6 - 17.2 g/dL    HCT 49.5 41.1 - 50.3 %    MCV 94.1 79.6 - 97.8 FL    MCH 31.7 26.1 - 32.9 PG    MCHC 33.7 31.4 - 35.0 g/dL    RDW 11.9 11.9 - 14.6 %    PLATELET 001 195 - 405 K/uL    MPV 8.8 (L) 9.4 - 12.3 FL    ABSOLUTE NRBC 0.00 0.0 - 0.2 K/uL    DF AUTOMATED      NEUTROPHILS 46 43 - 78 %    LYMPHOCYTES 43 13 - 44 %    MONOCYTES 9 4.0 - 12.0 %    EOSINOPHILS 1 0.5 - 7.8 %    BASOPHILS 1 0.0 - 2.0 %    IMMATURE GRANULOCYTES 1 0.0 - 5.0 %    ABS. NEUTROPHILS 4.0 1.7 - 8.2 K/UL    ABS. LYMPHOCYTES 3.8 0.5 - 4.6 K/UL    ABS. MONOCYTES 0.8 0.1 - 1.3 K/UL    ABS. EOSINOPHILS 0.1 0.0 - 0.8 K/UL    ABS. BASOPHILS 0.0 0.0 - 0.2 K/UL    ABS. IMM.  GRANS. 0.0 0.0 - 0.5 K/UL   METABOLIC PANEL, COMPREHENSIVE    Collection Time: 12/28/18  8:32 AM   Result Value Ref Range    Sodium 144 136 - 145 mmol/L    Potassium 3.3 (L) 3.5 - 5.1 mmol/L    Chloride 109 (H) 98 - 107 mmol/L    CO2 26 21 - 32 mmol/L    Anion gap 9 7 - 16 mmol/L    Glucose 108 (H) 65 - 100 mg/dL    BUN 13 6 - 23 MG/DL    Creatinine 0.94 0.8 - 1.5 MG/DL    GFR est AA >60 >60 ml/min/1.73m2    GFR est non-AA >60 >60 ml/min/1.73m2    Calcium 8.8 8.3 - 10.4 MG/DL    Bilirubin, total 0.5 0.2 - 1.1 MG/DL    ALT (SGPT) 33 12 - 65 U/L    AST (SGOT) 26 15 - 37 U/L    Alk.  phosphatase 69 50 - 136 U/L    Protein, total 7.7 6.3 - 8.2 g/dL    Albumin 3.9 3.5 - 5.0 g/dL    Globulin 3.8 (H) 2.3 - 3.5 g/dL    A-G Ratio 1.0 (L) 1.2 - 3.5     PTT    Collection Time: 12/28/18  8:32 AM   Result Value Ref Range    aPTT 27.0 24.7 - 39.8 SEC   POC TROPONIN-I    Collection Time: 12/28/18  8:34 AM   Result Value Ref Range    Troponin-I (POC) 0.8 (H) 0.02 - 0.05 ng/ml   DRUG SCREEN, URINE    Collection Time: 12/28/18  9:53 AM   Result Value Ref Range    PCP(PHENCYCLIDINE) NEGATIVE       BENZODIAZEPINES POSITIVE      COCAINE NEGATIVE       AMPHETAMINES NEGATIVE       METHADONE NEGATIVE       THC (TH-CANNABINOL) POSITIVE      OPIATES POSITIVE      BARBITURATES NEGATIVE      EKG, 12 LEAD, INITIAL    Collection Time: 12/28/18 11:26 AM   Result Value Ref Range    Ventricular Rate 67 BPM    Atrial Rate 67 BPM    P-R Interval 154 ms    QRS Duration 86 ms    Q-T Interval 374 ms    QTC Calculation (Bezet) 395 ms    Calculated P Axis 50 degrees    Calculated R Axis 26 degrees    Calculated T Axis 43 degrees    Diagnosis       Normal sinus rhythm  ST elevation, consider early repolarization, pericarditis, or injury  Abnormal ECG  When compared with ECG of 28-DEC-2018 08:28,  No significant change was found  Confirmed by ST VERÓNICA BERGERON MD (), STAR WONG ([id number]) on 12/28/2018 12:25:54 PM     TROPONIN I    Collection Time: 12/28/18 12:17 PM   Result Value Ref Range    Troponin-I, Qt. 1.75 (HH) 0.02 - 0.05 NG/ML   POC ACTIVATED CLOTTING TIME    Collection Time: 12/28/18  2:58 PM   Result Value Ref Range    Activated Clotting Time (POC) 257 (H) 70 - 128 SECS   POC ACTIVATED CLOTTING TIME    Collection Time: 12/28/18  3:37 PM   Result Value Ref Range    Activated Clotting Time (POC) 230 (H) 70 - 128 SECS   TROPONIN I    Collection Time: 12/28/18  5:45 PM   Result Value Ref Range    Troponin-I, Qt. 5.13 (HH) 0.02 - 0.05 NG/ML         Patient Instructions:   Current Discharge Medication List      START taking these medications    Details   atorvastatin (LIPITOR) 40 mg tablet Take 1 Tab by mouth nightly. Qty: 30 Tab, Refills: 11      lisinopril (PRINIVIL, ZESTRIL) 2.5 mg tablet Take 1 Tab by mouth daily. Qty: 30 Tab, Refills: 11      metoprolol succinate (TOPROL-XL) 25 mg XL tablet Take 1 Tab by mouth daily. Qty: 30 Tab, Refills: 3      aspirin 81 mg chewable tablet Take 1 Tab by mouth daily. ticagrelor (BRILINTA) 90 mg tablet Take 1 Tab by mouth every twelve (12) hours every twelve (12) hours for 10 days. Qty: 20 Tab, Refills: 0      nitroglycerin (NITROSTAT) 0.4 mg SL tablet 1 Tab by SubLINGual route every five (5) minutes as needed for Chest Pain. Up to 3 doses.   Qty: 25 Tab, Refills: 11           Dr. Mauri Dolan

## 2018-12-29 NOTE — PROGRESS NOTES
[facility] CARDIOLOGY PROGRESS NOTE 
 
12/29/2018 9:20 AM 
 
Admit Date: 12/28/2018 Admit Diagnosis: NSTEMI (non-ST elevated myocardial infarction) ([facility].) Subjective:  
Stable overnight without angina, CHF, or palpitations. Vitals stable and controlled. No other complaints overnight. Tolerating meds well. Objective:  
  
Vitals:  
 12/29/18 4816 12/29/18 1380 12/29/18 1495 12/29/18 0155 BP: 135/90 124/84 143/88 143/88 Pulse: 69 96 76 88 Resp: 16 16 18 Temp: 97.6 °F (36.4 °C) 99.5 °F (37.5 °C)  97.6 °F (36.4 °C) SpO2: 96% 96%  96% Weight:  118.8 kg (261 lb 12.8 oz) Height:      
 
 
Physical Exam: 
Neck- supple, no JVD 
CV- regular rate and rhythm no MRG Lung- clear bilaterally Abd- soft, nontender, nondistended Ext- no edema, right wrist CDI Skin- warm and dry Data Review:  
Recent Labs  
  12/29/18 
0406 12/28/18 
0013  144  
K 3.6 3.3*  
BUN 11 13 CREA 0.78* 0.94  108* WBC 9.6 8.7 HGB 17.1 16.7 HCT 49.5 49.5  202 CHOL 182  --   
TRIGL 143  --   
HDL 40  -- Assessment and Plan:  
 
Principal Problem: 
  NSTEMI (non-ST elevated myocardial infarction) ([facility].) (12/28/2018)- improved, no CHF or CP- DAPT on board, continue meds. Active Problems: 
  HTN (hypertension) (12/28/2018)- controlled, continue meds Substance dependency ([facility].) (12/28/2018)- cessation encouraged Dyslipidemia- statin max dose for now with outpatient surveillance The importance of compliance with dual antiplatelet therapy was emphasized. The risk of non-compliance, including stent thrombosis, acute MI, acute LV systolic dysfunction, and death was discussed and understood. Ready to go home this afternoon if we can get him brilinta thru [address] meds. If cannot get cheap meds, convert to plavix with 600mg x 1 today, then 75mg daily thereafter. Dionne Lesch, MD 
[address] Cardiology Pager [phone]

## 2018-12-29 NOTE — PROGRESS NOTES
Discharge instructions reviewed with patient. Prescriptions given for metoprolol, brilinta, nitrostat, lipitor, lisinopril and med info sheets provided for all new medications. Opportunity for questions provided. Patient voiced understanding of all discharge instructions.

## 2018-12-29 NOTE — PROGRESS NOTES
Received bedside and verbal report from Soila jett [facility]

## 2018-12-29 NOTE — PROGRESS NOTES
Problem: Falls - Risk of 
Goal: *Absence of Falls Document Leatha Padilla Fall Risk and appropriate interventions in the flowsheet. Outcome: Progressing Towards Goal 
Fall Risk Interventions: 
  
 
  
 
Medication Interventions: Evaluate medications/consider consulting pharmacy

## 2018-12-29 NOTE — PROGRESS NOTES
Radial compression band removed at 2031 after slowly reducing air from 14 cc to zero as per hospital protocol. No bleeding or hematoma noted. 2 x 2 gauze with tegaderm placed over puncture site. The affected extremity is warm and dry to the touch. Frequent vital signs documented per flowsheet. Patient instructed to call if any bleeding noted on gauze. Patient verbalized understanding the nursing instructions.

## 2018-12-29 NOTE — PROGRESS NOTES
Verbal bedside report given to Yanna Chung and Candi Cruz, oncoming RN. Patient's situation, background, assessment and recommendations provided. Kardex, Mar, and recent results also reviewed. Opportunity for questions provided. Oncoming RN assumed care of patient. Aggrastat 0.15 IV drip verified at bedside with oncoming RN. LAC site visualized. Right radial cath site CDI. TR band in place all air removed during bedside report. No bleeding. No hematoma. Palpable radial pulses. Voiding appropriately. Reviewed bedrest instructions and right arm limitations. Patient instructed to keep right straight without pushing/pulling with the extremity. Patient verbalizes understanding. Call light in reach.

## 2018-12-29 NOTE — DISCHARGE INSTRUCTIONS
Percutaneous Coronary Intervention: What to Expect at [facility]  Percutaneous coronary intervention (PCI) is the name for procedures that are used to open a blocked coronary artery. The two most common PCI procedures are coronary angioplasty and coronary stent placement. Your groin or arm may have a bruise and feel sore for a day or two after a percutaneous coronary intervention (PCI). You can do light activities around the house, but nothing strenuous for several days. This care sheet gives you a general idea about how long it will take for you to recover. But each person recovers at a different pace. Follow the steps below to get better as quickly as possible. How can you care for yourself at home? Activity  · Do not do strenuous exercise and do not lift anything heavy until your doctor says it is okay. You can walk around the house and do light activity, such as cooking. · For 7-10 days after you go home, do not take baths. Showers are okay. · Try not to walk up stairs for the first couple of days (if the catheter was placed in the artery in your groin). · Go back to regular exercise after seen by cardiologist. Exercise is good for your heart. Get at least 30 minutes of physical activity on most days of the week. Walking is a good choice. If your doctor says it is okay, you also may want to do other activities, such as running, swimming, cycling, or playing tennis. Diet  · Drink plenty of fluids to help your body flush out the dye. If you have kidney, heart, or liver disease and have to limit fluids, talk with your doctor before you increase the amount of fluids you drink. · Keep eating a heart-healthy, low-fat diet that has lots of fruits, vegetables, and whole grains. If you have not been eating this way, talk to your doctor. You also may want to talk to a dietitian. This expert can help you to learn about healthy foods and plan meals.   Medicines  · Your doctor may have prescribed a blood-thinning medicine like aspirin and/or Plavix. It is very important that you take these medicines daily exactly as directed in order to keep the coronary artery open and reduce your risk of a heart attack. · Call your doctor if you think you are having a problem with your medicine. Care of the catheter site  · For 1 or 2 days, you may keep a bandage over the spot where the catheter was inserted if needed. Most often, the bandage may be removed at discharge. · Put ice or a cold pack on the area for 10 to 15 minutes at a time to help with soreness or swelling. Put a thin cloth between the ice and your skin. Follow-up care is a key part of your treatment and safety. Be sure to make and go to all appointments, and call your doctor if you are having problems. Its also a good idea to know your test results. Keep an updated list of your medicines to show all medical providers. When should you call for help? Call 911 anytime you think you may need emergency care. For example, call if:  · You pass out (lose consciousness). · You have severe trouble breathing. · You have sudden chest pain and shortness of breath, or you cough up blood. · You have chest pain or pressure. This may occur with:  ¨ Sweating. ¨ Shortness of breath. ¨ Nausea or vomiting. ¨ Pain that spreads from the chest to the neck, jaw, or one or both shoulders or arms. ¨ Dizziness or lightheadedness. ¨ A fast or uneven pulse. After calling 911, chew 1 adult aspirin. Wait for an ambulance. Do not try to drive yourself. · You have been diagnosed with angina, and you have chest pain that does not go away with rest or is not getting better within 5 minutes after you take a dose of nitroglycerin. Call your doctor now or seek immediate medical care if:  · You are bleeding from the area where the catheter was put in your artery. · You have signs of infection, such as:  ¨ Increased pain, swelling, warmth, or redness.   ¨ Red streaks leading from the catheter site. ¨ Pus draining from the catheter site. ¨ Swollen lymph nodes in your neck, armpits, or groin. ¨ A fever. · Your leg or arm looks blue or feels cold, numb, or tingly. Watch closely for changes in your health, and be sure to contact your doctor if you have any problems. Where can you learn more? Go to [employer]  Enter G899 in the search box to learn more about \"Percutaneous Coronary Intervention: What to Expect at Home\". This care instruction is for use with your licensed healthcare professional. If you have questions about a medical condition or this instruction, always ask your healthcare professional. Care instructions adapted by [employer] (which disclaims liability or warranty for this information) from Kevyn Briceno, [address] [facility] 2008. [employer] disclaims any warranty or liability for your use of this information. Heart Attack: Care Instructions  Your Care Instructions    A heart attack (myocardial infarction, or MI) occurs when one or more of the coronary arteries, which supply the heart with oxygen-rich blood, is blocked. A blockage usually occurs when plaque inside the artery breaks open and a blood clot forms in the artery. After a heart attack, you may be worried about your future. Over the next several weeks, your heart will start to heal. Though it can be hard to break old habits, you can prevent another heart attack by making some lifestyle changes and by taking medicines. You may use this information for ideas about what to do at home to speed your recovery. Follow-up care is a key part of your treatment and safety. Be sure to make and go to all appointments, and call your doctor if you are having problems. It's also a good idea to know your test results and keep a list of the medicines you take. How can you care for yourself at home? Activity    Until your doctor says it is okay, do not do strenuous exercise.  And do not lift, pull, or push anything heavy. Ask your doctor what types of activities are safe for you.     If your doctor has not set you up with a cardiac rehabilitation (rehab) program, talk to him or her about whether that is right for you. Cardiac rehab includes supervised exercise. It also includes help with diet and lifestyle changes and emotional support. It may reduce your risk of future heart problems.     Increase your activities slowly. Take short rest breaks when you get tired.     If your doctor recommends it, get more exercise. Walking is a good choice. Bit by bit, increase the amount you walk every day. Try for at least 30 minutes on most days of the week. You also may want to swim, bike, or do other activities. Talk with your doctor before you start an exercise program to make sure it is safe for you.     Ask your doctor when you can drive, go back to work, and do other daily activities again.     You can have sex as soon as you feel ready for it. Often this means when you can easily walk around or climb stairs. Talk with your doctor if you have any concerns. If you are taking nitroglycerin, do not take erection-enhancing medicine such as sildenafil (Viagra), tadalafil (Cialis), or vardenafil (Levitra) .    Lifestyle changes    Do not smoke. Smoking increases your risk of another heart attack. If you need help quitting, talk to your doctor about stop-smoking programs and medicines. These can increase your chances of quitting for good.     Eat a heart-healthy diet that is low in saturated fat and salt, and is full of fruits, vegetables and whole-grains. Eat at least two servings of fish each week. You may get more details about how to eat healthy. But these tips can help you get started.     Stay at a healthy weight, or lose weight if you need to. Medicines    Be safe with medicines. Take your medicines exactly as prescribed. Call your doctor if you think you are having a problem with your medicine.  You will get more details on the specific medicines your doctor prescribes. Do not stop taking your medicine unless your doctor tells you to. Not taking your medicine might raise your risk of having another heart attack.     You may need several medicines to help lower your risk of another heart attack. These include:  Blood pressure medicines such as angiotensin-converting enzyme (ACE) inhibitors, ARBs (angiotensin II receptor blockers), and beta-blockers. Cholesterol medicine called statins. Aspirin and other blood thinners. These prevent blood clots that can cause a heart attack.     If your doctor has given you nitroglycerin, keep it with you at all times. If you have angina symptoms, such as chest pain or pressure, sit down and rest. Take the first dose of nitroglycerin as directed. If symptoms get worse or are not getting better within 5 minutes, call 911 right away. Stay on the phone. The emergency  will tell you what to do.     Do not take any over-the-counter medicines, vitamins, or herbal products without talking to your doctor first.    Staying healthy    Manage other health conditions such as high blood pressure and diabetes.     Avoid colds and flu. Get a pneumococcal vaccine shot. If you have had one before, ask your doctor whether you need another dose. Get the flu vaccine every year. If you must be around people with colds or flu, wash your hands often.     Be sure to tell your doctor about any angina symptoms you have had, even if they went away. Pay attention to your angina symptoms. Know what is typical for you and learn how to control it. Know when to call for help.     Talk to your family, friends, or a counselor about your feelings. It is normal to feel frightened, angry, hopeless, helpless, and even guilty. Talking openly about bad feelings can help you cope. If you have symptoms of depression, talk to your doctor. When should you call for help?   Call 911 anytime you think you may need emergency care. For example, call if:    You have symptoms of a heart attack. These may include:  Chest pain or pressure, or a strange feeling in the chest.  Sweating. Shortness of breath. Nausea or vomiting. Pain, pressure, or a strange feeling in the back, neck, jaw, or upper belly or in one or both shoulders or arms. Lightheadedness or sudden weakness. A fast or irregular heartbeat. After you call 911, the  may tell you to chew 1 adult-strength or 2 to 4 low-dose aspirin. Wait for an ambulance. Do not try to drive yourself.     You have angina symptoms (such as chest pain or pressure) that do not go away with rest or are not getting better within 5 minutes after you take a dose of nitroglycerin.     You passed out (lost consciousness).     You feel like you are having another heart attack.    Call your doctor now or seek immediate medical care if:    You are having angina symptoms, such as chest pain or pressure, more often than usual, or the symptoms are different or worse than usual.     You have new or increased shortness of breath.     You are dizzy or lightheaded, or you feel like you may faint.    Watch closely for changes in your health, and be sure to contact your doctor if you have any problems. Where can you learn more? Go to http://farshad-davy.info/. Enter [id number] in the search box to learn more about \"Heart Attack: Care Instructions. \"  Current as of: December 6, 2017  Content Version: 11.8  © 5424-8937 [employer]. Care instructions adapted under license by [employer] (which disclaims liability or warranty for this information). If you have questions about a medical condition or this instruction, always ask your healthcare professional. [place] [address] any warranty or liability for your use of this information.       DISPOSITION: The patient is being discharged home in stable condition on a low saturated fat, low cholesterol and low salt diet. The patient is instructed to advance activities as tolerated to the limit of fatigue or shortness of breath. The patient is instructed to avoid all heavy lifting, straining, stooping or squatting for 3-5 days. The patient is instructed to watch the cath site for bleeding/oozing; if seen, the patient is instructed to apply firm pressure with a clean cloth and call [facility] at [phone]. The patient is instructed to watch for signs of infection which include: increasing area of redness, fever/hot to touch or purulent drainage at the catheterization site. The patient is instructed not to soak in a bathtub for 7-10 days, but is cleared to shower. The patient is instructed to call the office or return to the ER for immediate evaluation for any shortness of breath or chest pain not relieved by NTG. DISCHARGE SUMMARY from Nurse    PATIENT INSTRUCTIONS:    After general anesthesia or intravenous sedation, for 24 hours or while taking prescription Narcotics:  · Limit your activities  · Do not drive and operate hazardous machinery  · Do not make important personal or business decisions  · Do  not drink alcoholic beverages  · If you have not urinated within 8 hours after discharge, please contact your surgeon on call. Report the following to your surgeon:  · Excessive pain, swelling, redness or odor of or around the surgical area  · Temperature over 100.5  · Nausea and vomiting lasting longer than 4 hours or if unable to take medications  · Any signs of decreased circulation or nerve impairment to extremity: change in color, persistent  numbness, tingling, coldness or increase pain  · Any questions    What to do at Home:  Recommended activity: Activity as tolerated and no driving for today. If you experience any of the following symptoms chest pain, shortness of breath, please follow up with [facility].     *  Please give a list of your current medications to your Primary Care Provider. *  Please update this list whenever your medications are discontinued, doses are      changed, or new medications (including over-the-counter products) are added. *  Please carry medication information at all times in case of emergency situations. These are general instructions for a healthy lifestyle:    No smoking/ No tobacco products/ Avoid exposure to second hand smoke  Surgeon General's Warning:  Quitting smoking now greatly reduces serious risk to your health. Obesity, smoking, and sedentary lifestyle greatly increases your risk for illness    A healthy diet, regular physical exercise & weight monitoring are important for maintaining a healthy lifestyle    You may be retaining fluid if you have a history of heart failure or if you experience any of the following symptoms:  Weight gain of 3 pounds or more overnight or 5 pounds in a week, increased swelling in our hands or feet or shortness of breath while lying flat in bed. Please call your doctor as soon as you notice any of these symptoms; do not wait until your next office visit. Recognize signs and symptoms of STROKE:    F-face looks uneven    A-arms unable to move or move unevenly    S-speech slurred or non-existent    T-time-call 911 as soon as signs and symptoms begin-DO NOT go       Back to bed or wait to see if you get better-TIME IS BRAIN. Warning Signs of HEART ATTACK     Call 911 if you have these symptoms:   Chest discomfort. Most heart attacks involve discomfort in the center of the chest that lasts more than a few minutes, or that goes away and comes back. It can feel like uncomfortable pressure, squeezing, fullness, or pain.  Discomfort in other areas of the upper body. Symptoms can include pain or discomfort in one or both arms, the back, neck, jaw, or stomach.  Shortness of breath with or without chest discomfort.  Other signs may include breaking out in a cold sweat, nausea, or lightheadedness.   Don't wait more than five minutes to call 911 - MINUTES MATTER! Fast action can save your life. Calling 911 is almost always the fastest way to get lifesaving treatment. Emergency Medical Services staff can begin treatment when they arrive -- up to an hour sooner than if someone gets to the hospital by car. The discharge information has been reviewed with the {PATIENT PARENT GUARDIAN:56796}. The {PATIENT PARENT GUARDIAN:91568} verbalized understanding. Discharge medications reviewed with the {Dishcarge meds reviewed APSW:36785} and appropriate educational materials and side effects teaching were provided.   ___________________________________________________________________________________________________________________________________

## 2018-12-29 NOTE — PROGRESS NOTES
Verbal bedside report given to [facility] oncbianca RN. Patient's situation, background, assessment and recommendations provided. Opportunity for questions provided. Oncoming RN assumed care of patient. R radial site visualized.

## 2018-12-30 NOTE — PROGRESS NOTES
LMSW follow up prior to pt discharge yesterday. Pt is uninsured. Voucher provided for discharge meds. Pt instructed to follow up with Cardiology office with in the week about a continued supply of Brilinta and for office to assist him with submitting the pharmaceutical company application for a free supply of [address].   HOP referral.

## 2019-01-07 PROBLEM — E66.01 SEVERE OBESITY (HCC): Status: ACTIVE | Noted: 2019-01-07

## 2019-01-07 PROBLEM — E78.2 MIXED HYPERLIPIDEMIA: Status: ACTIVE | Noted: 2019-01-07

## 2019-01-07 PROBLEM — I25.10 CORONARY ARTERY DISEASE INVOLVING NATIVE CORONARY ARTERY OF NATIVE HEART WITHOUT ANGINA PECTORIS: Status: ACTIVE | Noted: 2019-01-07

## 2019-01-09 NOTE — PROCEDURES
[address] [facility] CATH    Name:Willie RAMÍREZ  MR#: [id number]  : 1980  ACCOUNT #: [de-identified]   DATE OF SERVICE: 2018    PROCEDURE:  Left heart catheterization, selective coronary arteriography, left ventriculogram via the right radial approach. INDICATION:  Recurrent chest pain with positive cardiac biomarkers consistent with non-ST elevation myocardial infarction. DESCRIPTION OF PROCEDURE:  After informed consent was obtained, the patient was brought to the cardiac catheterization lab. The right radial artery was prepped and draped in usual sterile fashion. Utilizing modified Seldinger technique and micropuncture needle, the right radial artery was entered. A 6-[place] citizen Terumo slender sheath was placed without difficulty. A radial cocktail consisting of 2000 units of heparin, 2 mg of verapamil and 200 mcg of nitroglycerin was administered. A 5-[place] citizen Tiger 4.0 catheter was used to selectively engage the ostium of the left main coronary artery and right coronary artery respectively. Selective injection in various projections was performed. A pigtail catheter used to cross the aortic valve and enter the left ventricle. Hemodynamic measurements, left ventriculogram were obtained. Left ventricular aortic pressure gradient was obtained by pullback technique. At the conclusion of diagnostic procedure, the patient was referred for PCI of the LAD. Please see procedure note for details. CONTRAST:  Isovue 230. SEDATION:  The patient received 8 mg of Versed and 100 mcg of fentanyl. Sedation start time was 1440 with a procedure  completion time 1541. Sedation was administered by Sandy Arevalo RN under my supervision. HEMODYNAMICS:  1. Aortic pressure 119/74 with a mean of 91.  2.  Left ventricular end diastolic pressure 14.  3.  There is no significant gradient across the aortic valve. ANGIOGRAPHIC RESULTS:  1.   Left main coronary artery:  Large caliber vessel. Angiographically normal.    2.  LAD:  Large caliber vessel. Complex 99% proximal stenosis consistent with a ruptured plaque. The remainder of the LAD appears angiographically normal.  There is BOOKER 3 distal flow. 3.  First diagonal artery:  Medium caliber vessel appeared patent. 4.  Ramus intermediate is a medium caliber vessel. 20% ostial stenosis. 5.  Left circumflex:  Medium caliber nondominant vessel. Angiographically normal.    6.  First obtuse marginal artery:  Medium caliber vessel, appeared normal.  7.  Right coronary artery is a large caliber dominant vessel. Angiographically normal.  8.  Right PDA:  Medium to large caliber vessel. Angiographically normal.  9.  Right posterolateral branch:  Medium to large caliber vessel, appeared normal.  10.  Left ventriculogram performed in TRUONG projection shows low normal LV systolic function with EF 50%. No focal segmental wall motion abnormalities were identified. Aortic root is nondilated. CONCLUSION:  Obstructive 1-vessel coronary artery disease involving the proximal LAD. PLAN:  Proceed with PCI. PERCUTANEOUS CORONARY INTERVENTION NOTE:    LESION:  Proximal LAD pre-stenosis 99%, post-stenosis 0%. TECHNICAL FACTORS:  The patient received intravenous heparin and Aggrastat. ACT was greater than 300. XB 3.5 guide was used to selectively engage the ostium of left main coronary artery and a Prowater wire was positioned distal to the stenosis. The stenosis was initially predilated with a 3.0 x 15 mm Euphora balloon. Following this, intravascular ultrasound was performed within the proximal LAD. This showed that the vessel was approximately 4.5-5 mm proximally at 4.0 in the mid segment. Based on this, a 4.0 x 24 mm Synergy drug-eluting stent was positioned and deployed at 14 atmospheres. Following this, NC Euphora 4.0 x 15 mm balloon was positioned, deployed at 24 atmospheres.   At this point, a 4.5 x 8 mm Quantum [place] was positioned proximally and the stent was deployed to 20 atmospheres. At this point, repeat intravascular ultrasound was performed. This showed mild stent malapposition in the very proximal portion of the LAD at the ostium. At this point, a 5.0 x 15 mm Trek balloon was positioned and deployed to 10 atmospheres. Following this, repeat IVUS showed excellent stent apposition throughout the course of the vessel. The wire was removed and final orthogonal projections were obtained. CONCLUSION:    1. Successful PCI and stenting of the proximal LAD with a 4.0 x 24 mm Synergy drug-eluting stent. The proximal portion of the stent was postdilated to 5 mm. 2. Pre and post PCI intravascular ultrasound to guide stent sizing and stent optimization following PCI. PLAN:  Monitor the patient closely in the post-procedure setting.       MD PARTH Gill / LEANN  D: 01/08/2019 18:48     T: 01/08/2019 21:25  JOB #: [id number]  CC: Pauline Prieto MD

## 2019-02-19 PROBLEM — I25.5 ISCHEMIC CARDIOMYOPATHY: Status: ACTIVE | Noted: 2019-02-19

## 2019-02-21 ENCOUNTER — HOSPITAL ENCOUNTER (OUTPATIENT)
Dept: LAB | Age: 39
Discharge: HOME | End: 2019-02-21
Attending: INTERNAL MEDICINE
Payer: SUBSIDIZED

## 2019-02-21 DIAGNOSIS — E78.2 MIXED HYPERLIPIDEMIA: ICD-10-CM

## 2019-02-21 LAB
ALBUMIN SERPL-MCNC: 4 G/DL (ref 3.5–5)
ALBUMIN/GLOB SERPL: 1.2 {RATIO}
ALP SERPL-CCNC: 78 U/L (ref 50–136)
ALT SERPL-CCNC: 59 U/L (ref 12–65)
ANION GAP SERPL CALC-SCNC: 7 MMOL/L
AST SERPL-CCNC: 28 U/L (ref 15–37)
BILIRUB SERPL-MCNC: 0.7 MG/DL (ref 0.2–1.1)
BUN SERPL-MCNC: 13 MG/DL (ref 6–23)
CALCIUM SERPL-MCNC: 8.8 MG/DL (ref 8.3–10.4)
CHLORIDE SERPL-SCNC: 109 MMOL/L (ref 98–107)
CHOLEST SERPL-MCNC: 116 MG/DL
CO2 SERPL-SCNC: 25 MMOL/L (ref 21–32)
CREAT SERPL-MCNC: 0.9 MG/DL (ref 0.8–1.5)
GLOBULIN SER CALC-MCNC: 3.4 G/DL
GLUCOSE SERPL-MCNC: 97 MG/DL (ref 65–100)
HDLC SERPL-MCNC: 39 MG/DL (ref 40–60)
HDLC SERPL: 3 {RATIO}
LDLC SERPL CALC-MCNC: 51.2 MG/DL
LIPID PROFILE,FLP: ABNORMAL
POTASSIUM SERPL-SCNC: 4.1 MMOL/L (ref 3.5–5.1)
PROT SERPL-MCNC: 7.4 G/DL (ref 6.3–8.2)
SODIUM SERPL-SCNC: 141 MMOL/L (ref 136–145)
TRIGL SERPL-MCNC: 129 MG/DL (ref 35–150)
VLDLC SERPL CALC-MCNC: 25.8 MG/DL (ref 6–23)

## 2019-02-21 PROCEDURE — 80053 COMPREHEN METABOLIC PANEL: CPT

## 2019-02-21 PROCEDURE — 36415 COLL VENOUS BLD VENIPUNCTURE: CPT

## 2019-02-21 PROCEDURE — 80061 LIPID PANEL: CPT

## 2019-03-22 ENCOUNTER — HOSPITAL ENCOUNTER (OUTPATIENT)
Dept: CARDIAC REHAB | Age: 39
Discharge: HOME | End: 2019-03-22

## 2019-03-22 NOTE — PROGRESS NOTES
Dear Dr. Rossy Lea,    Thank you for referring your patient,Mr. Zaida Steele ( 1980), to the Cardiopulmonary Rehabilitation Program at [facility]. He is a good candidate for the Cardiac Rehab Program and should see improvements with regular participation. We will be addressing appropriate interventions for modifiable risk factors with your patient during the next 12 weeks. We will contact you with any issues or concerns that may arise, or you can follow your patient's progress through [facility] at any time. A final summary will be sent to you when the program is completed. Again, thank you for the referral. If we can be of further assistance, please feel free to contact the Cardiopulmonary Rehab staff at [phone].     Sincerely,    VIKTORIYA Alvarado, RN  Cardiopulmonary Rehabilitation Nurse Liaison  [facility]

## 2019-03-26 ENCOUNTER — HOSPITAL ENCOUNTER (OUTPATIENT)
Dept: CARDIAC REHAB | Age: 39
Discharge: HOME | End: 2019-03-26
Payer: SUBSIDIZED

## 2019-03-26 VITALS — HEIGHT: 71 IN | WEIGHT: 252.2 LBS | BODY MASS INDEX: 35.31 KG/M2

## 2019-03-26 PROCEDURE — 93798 PHYS/QHP OP CAR RHAB W/ECG: CPT

## 2019-03-27 ENCOUNTER — HOSPITAL ENCOUNTER (OUTPATIENT)
Dept: CARDIAC REHAB | Age: 39
Discharge: HOME | End: 2019-03-27
Payer: SUBSIDIZED

## 2019-03-27 PROCEDURE — 93798 PHYS/QHP OP CAR RHAB W/ECG: CPT

## 2019-04-01 ENCOUNTER — HOSPITAL ENCOUNTER (OUTPATIENT)
Dept: CARDIAC REHAB | Age: 39
Discharge: HOME | End: 2019-04-01
Payer: SUBSIDIZED

## 2019-04-01 PROCEDURE — 93798 PHYS/QHP OP CAR RHAB W/ECG: CPT

## 2019-04-03 ENCOUNTER — HOSPITAL ENCOUNTER (OUTPATIENT)
Dept: CARDIAC REHAB | Age: 39
Discharge: HOME | End: 2019-04-03
Payer: SUBSIDIZED

## 2019-04-03 VITALS — WEIGHT: 253.4 LBS | BODY MASS INDEX: 35.34 KG/M2

## 2019-04-03 PROCEDURE — 93798 PHYS/QHP OP CAR RHAB W/ECG: CPT

## 2019-04-04 ENCOUNTER — HOSPITAL ENCOUNTER (OUTPATIENT)
Dept: CARDIAC REHAB | Age: 39
Discharge: HOME | End: 2019-04-04
Payer: SUBSIDIZED

## 2019-04-04 PROCEDURE — 93798 PHYS/QHP OP CAR RHAB W/ECG: CPT

## 2019-04-08 ENCOUNTER — HOSPITAL ENCOUNTER (OUTPATIENT)
Dept: CARDIAC REHAB | Age: 39
Discharge: HOME | End: 2019-04-08
Payer: SUBSIDIZED

## 2019-04-08 PROCEDURE — 93798 PHYS/QHP OP CAR RHAB W/ECG: CPT

## 2019-04-10 ENCOUNTER — HOSPITAL ENCOUNTER (OUTPATIENT)
Dept: CARDIAC REHAB | Age: 39
Discharge: HOME | End: 2019-04-10
Payer: SUBSIDIZED

## 2019-04-10 VITALS — BODY MASS INDEX: 34.84 KG/M2 | WEIGHT: 249.8 LBS

## 2019-04-10 PROCEDURE — 93798 PHYS/QHP OP CAR RHAB W/ECG: CPT

## 2019-04-11 ENCOUNTER — APPOINTMENT (OUTPATIENT)
Dept: CARDIAC REHAB | Age: 39
End: 2019-04-11
Payer: SUBSIDIZED

## 2019-04-11 NOTE — PROGRESS NOTES
42year old male, s/p STEMI and PCI on 12/28/18 enrolled in cardiac rehab, seen for initial nutrition counseling. Has a good appetite and improving energy level today. Stated Nutrition Goals: Laurie Gordonkim more, eat out less; to try to eat more vegetables. Medical History:  HTN, GERD, LDL, HTN Nutrition related medications/supplements:  Statin, aspirin, betablocker,  
 
Nutrition Related Labs: HDL 39 (L) Social History: unemployed, planning to return to work after healing from surgery,  lives alone. Physical Activity: Cardiac rehab 3 x per week. Pt also shares that he is exercising 5 days a week in an outside gym. Food and Nutrition Intake History:  
Pt eats out minimum of 1x per day. Since surgery, has cut out red meat, decreased intake sweetened beverages, occasionally eating a burger and a cup of soda. He does not like vegetables, particularly raw vegetables. Skips meals, attributing it poor hunger cues. Wakes up at 8am to take medications with water, then goes back to sleep until 10am.  Shares he has not eat anything prior to our 1pm session today. Finds that he craves to eat late at night, and tends to eat snacks, so he is now eating just a fruit cup. Alcohol use: none Anthropometric Data: 
Height: 5' 11\" (1.803 m). Weight: 113.3 kg (249 lb 12.8 oz); IBW (+10%)= 189 pounds BMI: 34.84 kg/m² ; Class 1 obsesity Waist measurement (inches): 45.5inches Estimated Nutritional Needs: 
Calories: MSJ x -1.3 =2700 kcal  (-500kcal) for healthy weight loss: 2200kcal/day Protein:  110g/day Stage of Behavior Change: preperation Nutrition Diagnosis: Imbalanced intake of nutrients related to food choices/ preferences/ poor evidenced by diet recall. Nutrition Intervention: 
heart healthy eating pattern, sodium guidelines, demonstrated label reading and food albert for home use. Handouts: 2300kcal/day heart healthy eating pattern, recipes, recipe modification tips, plate method Nutrition Goals: To improve diet quality by incorporating more vegetables and improving self efficacy around meal planning/ cooking by the end of cardiac rehab. Monitoring/Evaluation: RD to follow up with participant during cardiac rehab sessions for questions and assessment of progression toward goals. Compliance: Patient agreeable to try 1 new vegetable recipe provided, and food albert and incorporate 1 small meal within 2 hours of waking up by next visit.   
 
Augie Snow, MS, RD LD

## 2019-04-15 ENCOUNTER — HOSPITAL ENCOUNTER (OUTPATIENT)
Dept: CARDIAC REHAB | Age: 39
End: 2019-04-15
Payer: SUBSIDIZED

## 2019-04-17 ENCOUNTER — HOSPITAL ENCOUNTER (OUTPATIENT)
Dept: CARDIAC REHAB | Age: 39
Discharge: HOME | End: 2019-04-17
Payer: SUBSIDIZED

## 2019-04-17 VITALS — BODY MASS INDEX: 35.01 KG/M2 | WEIGHT: 251 LBS

## 2019-04-17 PROCEDURE — 93798 PHYS/QHP OP CAR RHAB W/ECG: CPT

## 2019-04-18 ENCOUNTER — HOSPITAL ENCOUNTER (OUTPATIENT)
Dept: CARDIAC REHAB | Age: 39
Discharge: HOME | End: 2019-04-18
Payer: SUBSIDIZED

## 2019-04-18 PROCEDURE — 93798 PHYS/QHP OP CAR RHAB W/ECG: CPT

## 2019-04-22 ENCOUNTER — HOSPITAL ENCOUNTER (OUTPATIENT)
Dept: CARDIAC REHAB | Age: 39
Discharge: HOME | End: 2019-04-22
Payer: SUBSIDIZED

## 2019-04-22 PROCEDURE — 93798 PHYS/QHP OP CAR RHAB W/ECG: CPT

## 2019-04-24 ENCOUNTER — HOSPITAL ENCOUNTER (OUTPATIENT)
Dept: CARDIAC REHAB | Age: 39
Discharge: HOME | End: 2019-04-24
Payer: SUBSIDIZED

## 2019-04-24 VITALS — BODY MASS INDEX: 35.01 KG/M2 | WEIGHT: 251 LBS

## 2019-04-24 PROCEDURE — 93798 PHYS/QHP OP CAR RHAB W/ECG: CPT

## 2019-04-25 ENCOUNTER — HOSPITAL ENCOUNTER (OUTPATIENT)
Dept: CARDIAC REHAB | Age: 39
Discharge: HOME | End: 2019-04-25
Payer: SUBSIDIZED

## 2019-04-25 PROCEDURE — 93798 PHYS/QHP OP CAR RHAB W/ECG: CPT

## 2019-04-29 ENCOUNTER — HOSPITAL ENCOUNTER (OUTPATIENT)
Dept: CARDIAC REHAB | Age: 39
End: 2019-04-29
Payer: SUBSIDIZED

## 2019-05-01 ENCOUNTER — HOSPITAL ENCOUNTER (OUTPATIENT)
Dept: CARDIAC REHAB | Age: 39
Discharge: HOME | End: 2019-05-01
Payer: SUBSIDIZED

## 2019-05-01 VITALS — BODY MASS INDEX: 35.09 KG/M2 | WEIGHT: 251.6 LBS

## 2019-05-01 PROCEDURE — 93798 PHYS/QHP OP CAR RHAB W/ECG: CPT

## 2019-05-02 ENCOUNTER — HOSPITAL ENCOUNTER (OUTPATIENT)
Dept: CARDIAC REHAB | Age: 39
Discharge: HOME | End: 2019-05-02
Payer: SUBSIDIZED

## 2019-05-02 PROCEDURE — 93798 PHYS/QHP OP CAR RHAB W/ECG: CPT

## 2019-05-06 ENCOUNTER — HOSPITAL ENCOUNTER (OUTPATIENT)
Dept: CARDIAC REHAB | Age: 39
Discharge: HOME | End: 2019-05-06
Payer: SUBSIDIZED

## 2019-05-06 PROCEDURE — 93798 PHYS/QHP OP CAR RHAB W/ECG: CPT

## 2019-05-08 ENCOUNTER — HOSPITAL ENCOUNTER (OUTPATIENT)
Dept: CARDIAC REHAB | Age: 39
End: 2019-05-08
Payer: SUBSIDIZED

## 2019-05-09 ENCOUNTER — APPOINTMENT (OUTPATIENT)
Dept: CARDIAC REHAB | Age: 39
End: 2019-05-09
Payer: SUBSIDIZED

## 2019-05-13 ENCOUNTER — HOSPITAL ENCOUNTER (OUTPATIENT)
Dept: CARDIAC REHAB | Age: 39
Discharge: HOME | End: 2019-05-13
Payer: SUBSIDIZED

## 2019-05-13 PROCEDURE — 93798 PHYS/QHP OP CAR RHAB W/ECG: CPT

## 2019-05-13 NOTE — CARDIO/PULMONARY
Patient reported today that he has felt down in dumps this week. Patient reports he is not thinking about harming himself. Patient reports sees psychologist at [facility]. Next appointment is in July, but he will call them this week to set up another appointment.

## 2019-05-15 ENCOUNTER — HOSPITAL ENCOUNTER (OUTPATIENT)
Dept: CARDIAC REHAB | Age: 39
Discharge: HOME | End: 2019-05-15
Payer: SUBSIDIZED

## 2019-05-15 VITALS — BODY MASS INDEX: 34.87 KG/M2 | WEIGHT: 250 LBS

## 2019-05-15 PROCEDURE — 93798 PHYS/QHP OP CAR RHAB W/ECG: CPT

## 2019-05-16 ENCOUNTER — HOSPITAL ENCOUNTER (OUTPATIENT)
Dept: CARDIAC REHAB | Age: 39
End: 2019-05-16
Payer: SUBSIDIZED

## 2019-05-20 ENCOUNTER — HOSPITAL ENCOUNTER (OUTPATIENT)
Dept: CARDIAC REHAB | Age: 39
Discharge: HOME | End: 2019-05-20
Payer: SUBSIDIZED

## 2019-05-21 NOTE — CARDIO/PULMONARY
Dear Dr. Ontiveros Snowball: 
Audrey Baldwin (: 1980) attended 17 sessions of cardiac rehab from 3/26/19 - 5/15/19. He decided not to return stating distance and schedule and was discharged today, 19, at his request. No outcome data is available since patient would not return for final assessment. Thank you for referring this patient. Please let us know if we can be of further assistance. Thank you. Sejal Singleton, SUMMERN, RN Cardiac Rehab, [facility]

## 2019-05-22 ENCOUNTER — APPOINTMENT (OUTPATIENT)
Dept: CARDIAC REHAB | Age: 39
End: 2019-05-22
Payer: SUBSIDIZED

## 2019-05-23 ENCOUNTER — APPOINTMENT (OUTPATIENT)
Dept: CARDIAC REHAB | Age: 39
End: 2019-05-23
Payer: SUBSIDIZED

## 2019-05-27 ENCOUNTER — APPOINTMENT (OUTPATIENT)
Dept: CARDIAC REHAB | Age: 39
End: 2019-05-27
Payer: SUBSIDIZED

## 2019-05-29 ENCOUNTER — APPOINTMENT (OUTPATIENT)
Dept: CARDIAC REHAB | Age: 39
End: 2019-05-29
Payer: SUBSIDIZED

## 2019-05-30 ENCOUNTER — APPOINTMENT (OUTPATIENT)
Dept: CARDIAC REHAB | Age: 39
End: 2019-05-30
Payer: SUBSIDIZED

## 2019-06-03 ENCOUNTER — APPOINTMENT (OUTPATIENT)
Dept: CARDIAC REHAB | Age: 39
End: 2019-06-03

## 2019-06-05 ENCOUNTER — APPOINTMENT (OUTPATIENT)
Dept: CARDIAC REHAB | Age: 39
End: 2019-06-05

## 2019-06-06 ENCOUNTER — APPOINTMENT (OUTPATIENT)
Dept: CARDIAC REHAB | Age: 39
End: 2019-06-06

## 2019-06-10 ENCOUNTER — APPOINTMENT (OUTPATIENT)
Dept: CARDIAC REHAB | Age: 39
End: 2019-06-10

## 2019-06-12 ENCOUNTER — APPOINTMENT (OUTPATIENT)
Dept: CARDIAC REHAB | Age: 39
End: 2019-06-12

## 2019-06-13 ENCOUNTER — APPOINTMENT (OUTPATIENT)
Dept: CARDIAC REHAB | Age: 39
End: 2019-06-13

## 2019-06-21 PROBLEM — F41.9 ANXIETY: Status: ACTIVE | Noted: 2019-06-21

## 2019-07-03 ENCOUNTER — HOSPITAL ENCOUNTER (EMERGENCY)
Age: 39
Discharge: HOME | End: 2019-07-03
Payer: SUBSIDIZED

## 2019-07-03 ENCOUNTER — APPOINTMENT (OUTPATIENT)
Dept: GENERAL RADIOLOGY | Age: 39
End: 2019-07-03
Payer: SUBSIDIZED

## 2019-07-03 VITALS
TEMPERATURE: 97.9 F | DIASTOLIC BLOOD PRESSURE: 97 MMHG | WEIGHT: 245 LBS | BODY MASS INDEX: 34.3 KG/M2 | HEART RATE: 86 BPM | OXYGEN SATURATION: 98 % | RESPIRATION RATE: 16 BRPM | HEIGHT: 71 IN | SYSTOLIC BLOOD PRESSURE: 144 MMHG

## 2019-07-03 DIAGNOSIS — S61.432A GUNSHOT WOUND OF LEFT HAND, INITIAL ENCOUNTER: Primary | ICD-10-CM

## 2019-07-03 LAB
ALBUMIN SERPL-MCNC: 4.2 G/DL (ref 3.5–5)
ALBUMIN/GLOB SERPL: 1.2 {RATIO} (ref 1.2–3.5)
ALP SERPL-CCNC: 83 U/L (ref 50–136)
ALT SERPL-CCNC: 27 U/L (ref 12–65)
ANION GAP SERPL CALC-SCNC: 13 MMOL/L (ref 7–16)
AST SERPL-CCNC: 17 U/L (ref 15–37)
BASOPHILS # BLD: 0 K/UL (ref 0–0.2)
BASOPHILS NFR BLD: 0 % (ref 0–2)
BILIRUB SERPL-MCNC: 0.4 MG/DL (ref 0.2–1.1)
BUN SERPL-MCNC: 14 MG/DL (ref 6–23)
CALCIUM SERPL-MCNC: 9 MG/DL (ref 8.3–10.4)
CHLORIDE SERPL-SCNC: 107 MMOL/L (ref 98–107)
CO2 SERPL-SCNC: 25 MMOL/L (ref 21–32)
CREAT SERPL-MCNC: 1.06 MG/DL (ref 0.8–1.5)
DIFFERENTIAL METHOD BLD: ABNORMAL
EOSINOPHIL # BLD: 0 K/UL (ref 0–0.8)
EOSINOPHIL NFR BLD: 0 % (ref 0.5–7.8)
ERYTHROCYTE [DISTWIDTH] IN BLOOD BY AUTOMATED COUNT: 12.2 % (ref 11.9–14.6)
GLOBULIN SER CALC-MCNC: 3.6 G/DL (ref 2.3–3.5)
GLUCOSE SERPL-MCNC: 91 MG/DL (ref 65–100)
HCT VFR BLD AUTO: 47.1 % (ref 41.1–50.3)
HGB BLD-MCNC: 16.1 G/DL (ref 13.6–17.2)
IMM GRANULOCYTES # BLD AUTO: 0 K/UL (ref 0–0.5)
IMM GRANULOCYTES NFR BLD AUTO: 0 % (ref 0–5)
LYMPHOCYTES # BLD: 4.6 K/UL (ref 0.5–4.6)
LYMPHOCYTES NFR BLD: 44 % (ref 13–44)
MCH RBC QN AUTO: 32.5 PG (ref 26.1–32.9)
MCHC RBC AUTO-ENTMCNC: 34.2 G/DL (ref 31.4–35)
MCV RBC AUTO: 95.2 FL (ref 79.6–97.8)
MONOCYTES # BLD: 0.8 K/UL (ref 0.1–1.3)
MONOCYTES NFR BLD: 8 % (ref 4–12)
NEUTS SEG # BLD: 4.9 K/UL (ref 1.7–8.2)
NEUTS SEG NFR BLD: 47 % (ref 43–78)
NRBC # BLD: 0 K/UL (ref 0–0.2)
PLATELET # BLD AUTO: 253 K/UL (ref 150–450)
PMV BLD AUTO: 9.6 FL (ref 9.4–12.3)
POTASSIUM SERPL-SCNC: 2.8 MMOL/L (ref 3.5–5.1)
PROT SERPL-MCNC: 7.8 G/DL (ref 6.3–8.2)
RBC # BLD AUTO: 4.95 M/UL (ref 4.23–5.6)
SODIUM SERPL-SCNC: 145 MMOL/L (ref 136–145)
WBC # BLD AUTO: 10.4 K/UL (ref 4.3–11.1)

## 2019-07-03 PROCEDURE — 96375 TX/PRO/DX INJ NEW DRUG ADDON: CPT

## 2019-07-03 PROCEDURE — 90471 IMMUNIZATION ADMIN: CPT

## 2019-07-03 PROCEDURE — 90715 TDAP VACCINE 7 YRS/> IM: CPT | Performed by: EMERGENCY MEDICINE

## 2019-07-03 PROCEDURE — 74011250636 HC RX REV CODE- 250/636: Performed by: EMERGENCY MEDICINE

## 2019-07-03 PROCEDURE — 74011250636 HC RX REV CODE- 250/636

## 2019-07-03 PROCEDURE — 99284 EMERGENCY DEPT VISIT MOD MDM: CPT

## 2019-07-03 PROCEDURE — 85025 COMPLETE CBC W/AUTO DIFF WBC: CPT

## 2019-07-03 PROCEDURE — 96365 THER/PROPH/DIAG IV INF INIT: CPT

## 2019-07-03 PROCEDURE — 80053 COMPREHEN METABOLIC PANEL: CPT

## 2019-07-03 PROCEDURE — 73130 X-RAY EXAM OF HAND: CPT

## 2019-07-03 PROCEDURE — 96376 TX/PRO/DX INJ SAME DRUG ADON: CPT

## 2019-07-03 PROCEDURE — 74011000258 HC RX REV CODE- 258: Performed by: EMERGENCY MEDICINE

## 2019-07-03 RX ORDER — MORPHINE SULFATE 4 MG/ML
4 INJECTION INTRAVENOUS ONCE
Status: COMPLETED | OUTPATIENT
Start: 2019-07-03 | End: 2019-07-03

## 2019-07-03 RX ORDER — CEPHALEXIN 500 MG/1
500 CAPSULE ORAL 4 TIMES DAILY
Qty: 28 CAP | Refills: 0 | Status: SHIPPED | OUTPATIENT
Start: 2019-07-03 | End: 2019-07-10

## 2019-07-03 RX ORDER — MORPHINE SULFATE 4 MG/ML
4 INJECTION INTRAVENOUS
Status: COMPLETED | OUTPATIENT
Start: 2019-07-03 | End: 2019-07-03

## 2019-07-03 RX ORDER — HYDROCODONE BITARTRATE AND ACETAMINOPHEN 7.5; 325 MG/1; MG/1
1 TABLET ORAL
Qty: 6 TAB | Refills: 0 | Status: SHIPPED | OUTPATIENT
Start: 2019-07-03 | End: 2019-07-16

## 2019-07-03 RX ORDER — MORPHINE SULFATE 4 MG/ML
INJECTION INTRAVENOUS
Status: DISCONTINUED
Start: 2019-07-03 | End: 2019-07-03 | Stop reason: HOSPADM

## 2019-07-03 RX ADMIN — TETANUS TOXOID, REDUCED DIPHTHERIA TOXOID AND ACELLULAR PERTUSSIS VACCINE, ADSORBED 0.5 ML: 5; 2.5; 8; 8; 2.5 SUSPENSION INTRAMUSCULAR at 00:56

## 2019-07-03 RX ADMIN — MORPHINE SULFATE 4 MG: 4 INJECTION INTRAVENOUS at 00:56

## 2019-07-03 RX ADMIN — SODIUM CHLORIDE 1000 MG: 900 INJECTION, SOLUTION INTRAVENOUS at 00:56

## 2019-07-03 RX ADMIN — MORPHINE SULFATE 4 MG: 4 INJECTION INTRAVENOUS at 02:06

## 2019-07-03 NOTE — ED NOTES
Left hand wound cleansed with sterile saline. Patient tolerated well. Sterile telfa placed on entry and exit wound. Multiple 4x4 gauze applied and wrapped in kerlix. ACE wrap applied lightly to hold bandage in place. Patient educated not to get wet and to keep clean and dry. Advised to call Ortho MD tomorrow to ask about care to wound and follow-up.
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
## 2019-07-03 NOTE — ED NOTES
I have reviewed discharge instructions with the patient. The patient verbalized understanding. Patient left ED via Discharge Method: ambulatory to Home with friend who states he is driving patient home. Opportunity for questions and clarification provided. Patient given 2 scripts. To continue your aftercare when you leave the hospital, you may receive an automated call from our care team to check in on how you are doing. This is a free service and part of our promise to provide the best care and service to meet your aftercare needs.  If you have questions, or wish to unsubscribe from this service please call [phone]. Thank you for Choosing our [facility] [employer] [facility] Emergency Department.
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
## 2019-07-16 ENCOUNTER — HOSPITAL ENCOUNTER (OUTPATIENT)
Dept: WOUND CARE | Age: 39
Discharge: HOME | End: 2019-07-16
Attending: SURGERY
Payer: SUBSIDIZED

## 2019-07-16 VITALS
HEART RATE: 67 BPM | OXYGEN SATURATION: 99 % | DIASTOLIC BLOOD PRESSURE: 71 MMHG | BODY MASS INDEX: 37.27 KG/M2 | TEMPERATURE: 98.2 F | WEIGHT: 266.2 LBS | SYSTOLIC BLOOD PRESSURE: 102 MMHG | HEIGHT: 71 IN | RESPIRATION RATE: 18 BRPM

## 2019-07-16 PROCEDURE — 99214 OFFICE O/P EST MOD 30 MIN: CPT

## 2019-07-16 RX ORDER — HYDROCODONE BITARTRATE AND ACETAMINOPHEN 5; 325 MG/1; MG/1
1 TABLET ORAL
COMMUNITY
End: 2019-12-20
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
## 2019-07-16 NOTE — WOUND CARE
[address] [place] [address], [facility] [address] Phone: [phone] Fax: [phone] Patient: Julita Taylor. MRN: [id number]  SSN: [id number] YOB: 1980  Age: 42 y.o. Sex: male Return Appointment: 1 week with Jeannie Schneider MD 
 
Instructions: To Left palm: 
Cleanse wound with normal saline. Apply Acticoat Flex 3: cut top approximate size of wound, apply to wound bed. Cover with ABD and wrap with rolled gauze. Change dressing 3 times per week. To Left medial hand: 
Cleanse wound with normal saline. Apply Hydrofera Blue: Cut to wound size, moisten with saline, and apply to wound bed. Cover with ABD and wrap with rolled gauze. Change dressing 3 times per week. Continue doing wrist and finger exercises. Should you experience increased redness, swelling, pain, foul odor, size of wound(s), or have a temperature over 101 degrees please contact the [address] [place] [address] Road at [phone] or if after hours contact your primary care physician or go to the hospital emergency department. Signed By: Skip Mims RN   
 July 16, 2019

## 2019-07-16 NOTE — WOUND CARE
07/16/19 1454 Wound Hand Left;Medial  
Date First Assessed/Time First Assessed: 07/16/19 1453   Present on Hospital Admission: Yes  Primary Wound Type: Trauma  Location: Hand  Wound Location Orientation: Left;Medial  
Dressing Status Clean, dry, and intact Dressing Type  
(xeroform, rolled gauze) Non-staged Wound Description Full thickness Wound Length (cm) 3.5 cm Wound Width (cm) 3 cm Wound Depth (cm) 0.2 cm Wound Volume (cm^3) 2.1 cm^3 Condition of Base Granulation Assessment Edema;Painful Tissue Type Percent Red 50 Tissue Type Percent Yellow 50 Drainage Amount Small Drainage Color Serosanguinous Wound Odor None Mallory-wound Assessment Maceration Cleansing and Cleansing Agents  Normal saline Wound Hand Left palm Date First Assessed/Time First Assessed: 07/16/19 1501   Present on Hospital Admission: Yes  Primary Wound Type: Trauma  Location: Hand  Wound Location Orientation: Left  Wound Description: palm Dressing Status Clean, dry, and intact Dressing Type  
(xeroform, rolled gauze) Non-staged Wound Description Full thickness Wound Length (cm) 1 cm Wound Width (cm) 1.2 cm Wound Depth (cm) 0 cm Wound Volume (cm^3) 0 cm^3 Condition of Base Hypergranulation Assessment Painful Tissue Type Percent Red 100 Drainage Amount Small Drainage Color Serosanguinous Wound Odor None Mallory-wound Assessment Edema;Painful;Red Cleansing and Cleansing Agents  Normal saline PATIENT IS ON ANTICOAGULANT ASA.

## 2019-07-22 ENCOUNTER — HOSPITAL ENCOUNTER (OUTPATIENT)
Dept: WOUND CARE | Age: 39
Discharge: HOME | End: 2019-07-22
Attending: SURGERY
Payer: SUBSIDIZED

## 2019-07-22 VITALS
HEART RATE: 73 BPM | WEIGHT: 268.8 LBS | RESPIRATION RATE: 16 BRPM | HEIGHT: 71 IN | BODY MASS INDEX: 37.63 KG/M2 | TEMPERATURE: 98.2 F | SYSTOLIC BLOOD PRESSURE: 119 MMHG | DIASTOLIC BLOOD PRESSURE: 71 MMHG

## 2019-07-22 PROCEDURE — 99214 OFFICE O/P EST MOD 30 MIN: CPT

## 2019-07-22 NOTE — WOUND CARE
[address]  [address], [address]   Phone: [phone]  Fax: [phone]    Patient: Oxana Alonso. MRN: [id number]  SSN: [id number]    YOB: 1980  Age: 42 y.o. Sex: male       Return Appointment: 2 weeks with Donovan Morales MD    Instructions: To Left lateral hand:  Left palm  Cleanse wound with normal saline. Apply Hydrofera Blue: Cut to wound size, moisten with saline, and apply to wound bed. Cover with ABD and wrap with rolled gauze. Change dressing 3 times per week or prn as needed.     Continue doing wrist and finger exercises. Should you experience increased redness, swelling, pain, foul odor, size of wound(s), or have a temperature over 101 degrees please contact the [address] [place] [address] Road at [phone] or if after hours contact your primary care physician or go to the hospital emergency department.     Signed By: Saad Willingham     July 22, 2019

## 2019-07-22 NOTE — WOUND CARE
07/22/19 1323   Wound Hand Left;Medial   Date First Assessed/Time First Assessed: 07/16/19 1453   Present on Hospital Admission: Yes  Primary Wound Type: Trauma  Location: Hand  Wound Location Orientation: Left;Medial   Dressing Status Clean, dry, and intact   Dressing Type   (hydrofera blue, abd , rolled gauze)   Non-staged Wound Description Full thickness   Wound Length (cm) 2 cm   Wound Width (cm) 2 cm   Wound Depth (cm) 0.1 cm   Wound Volume (cm^3) 0.4 cm^3   Condition of Base Granulation   Tissue Type Percent Pink 100   Drainage Amount Small   Drainage Color Serosanguinous   Wound Odor None   Cleansing and Cleansing Agents  Normal saline   Dressing Changed Changed/New   Wound Hand Left palm   Date First Assessed/Time First Assessed: 07/16/19 1501   Present on Hospital Admission: Yes  Primary Wound Type: Trauma  Location: Hand  Wound Location Orientation: Left  Wound Description: palm   Dressing Status Clean, dry, and intact   Dressing Type   (acticoat, abd, rolled gauze)   Non-staged Wound Description Full thickness   Wound Length (cm) 1 cm   Wound Width (cm) 1 cm   Wound Depth (cm) 0.1 cm   Wound Volume (cm^3) 0.1 cm^3   Condition of Base Granulation   Tissue Type Percent Red 100   Drainage Amount Small   Drainage Color Serosanguinous   Wound Odor None   Cleansing and Cleansing Agents  Normal saline   Dressing Changed Changed/New           Patient is on an anti-coagulant daily Brilinta and ASA81.
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
## 2019-08-05 ENCOUNTER — HOSPITAL ENCOUNTER (OUTPATIENT)
Dept: WOUND CARE | Age: 39
Discharge: HOME | End: 2019-08-05
Attending: SURGERY
Payer: SUBSIDIZED

## 2019-08-05 VITALS
HEIGHT: 71 IN | WEIGHT: 256 LBS | OXYGEN SATURATION: 97 % | RESPIRATION RATE: 18 BRPM | DIASTOLIC BLOOD PRESSURE: 84 MMHG | SYSTOLIC BLOOD PRESSURE: 137 MMHG | BODY MASS INDEX: 35.84 KG/M2 | HEART RATE: 68 BPM | TEMPERATURE: 98.5 F

## 2019-08-05 PROCEDURE — 99214 OFFICE O/P EST MOD 30 MIN: CPT
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
## 2019-08-05 NOTE — WOUND CARE
[address] [place] [address] [facility] [place] Phone: [phone] Fax: [phone] Patient: Richa Puckett. MRN: [id number]  SSN: [id number] YOB: 1980  Age: 42 y.o. Sex: male Return Appointment: Discharge from wound center at this time. Instructions: Wound is healed. No need for dressings. Patient may shower and move hand without limitations. When scabs have completely fallen off in about 1-2 weeks, patient may begin scar massage with either vitamin E oil or coconut oil. Discharge from wound center at this time. Should you experience increased redness, swelling, pain, foul odor, size of wound(s), or have a temperature over 101 degrees please contact the [address] [place] [address] Road at [phone] or if after hours contact your primary care physician or go to the hospital emergency department. Signed By: Lyudmila Castillo, PT, [address] [facility] August 5, 2019

## 2019-08-05 NOTE — PROGRESS NOTES
08/05/19 1520   Wound Hand Left;Lateral   Date First Assessed/Time First Assessed: 07/16/19 1453   Present on Hospital Admission: Yes  Primary Wound Type: Trauma  Location: Hand  Wound Location Orientation: Left;Lateral   Dressing Status Clean, dry, and intact   Dressing Type   (no dressing)   Wound Length (cm) 0 cm   Wound Width (cm) 0 cm   Wound Depth (cm) 0 cm   Wound Volume (cm^3) 0 cm^3   Condition of Base Epithelializing   Epithelialization (%) 100   Drainage Amount None   Wound Odor None   Cleansing and Cleansing Agents  Normal saline   Wound Hand Left palm   Date First Assessed/Time First Assessed: 07/16/19 1501   Present on Hospital Admission: Yes  Primary Wound Type: Trauma  Location: Hand  Wound Location Orientation: Left  Wound Description: palm   Dressing Type   (no dressing)   Wound Length (cm) 0 cm   Wound Width (cm) 0 cm   Wound Depth (cm) 0 cm   Wound Volume (cm^3) 0 cm^3   Condition of Base Epithelializing   Epithelialization (%) 100   Drainage Amount None   Wound Odor None   Cleansing and Cleansing Agents  Normal saline           Patient is currently taking Brilinta and Aspirin 81 mg

## 2019-11-13 ENCOUNTER — HOSPITAL ENCOUNTER (OUTPATIENT)
Dept: LAB | Age: 39
Discharge: HOME | End: 2019-11-13
Payer: SUBSIDIZED

## 2019-11-13 DIAGNOSIS — E78.2 MIXED HYPERLIPIDEMIA: ICD-10-CM

## 2019-11-13 LAB
ALBUMIN SERPL-MCNC: 3.9 G/DL (ref 3.5–5)
ALBUMIN/GLOB SERPL: 1.2 {RATIO} (ref 1.2–3.5)
ALP SERPL-CCNC: 81 U/L (ref 50–136)
ALT SERPL-CCNC: 44 U/L (ref 12–65)
ANION GAP SERPL CALC-SCNC: 4 MMOL/L (ref 7–16)
AST SERPL-CCNC: 20 U/L (ref 15–37)
BILIRUB SERPL-MCNC: 0.5 MG/DL (ref 0.2–1.1)
BUN SERPL-MCNC: 10 MG/DL (ref 6–23)
CALCIUM SERPL-MCNC: 9 MG/DL (ref 8.3–10.4)
CHLORIDE SERPL-SCNC: 111 MMOL/L (ref 98–107)
CHOLEST SERPL-MCNC: 112 MG/DL
CO2 SERPL-SCNC: 30 MMOL/L (ref 21–32)
CREAT SERPL-MCNC: 0.99 MG/DL (ref 0.8–1.5)
GLOBULIN SER CALC-MCNC: 3.3 G/DL (ref 2.3–3.5)
GLUCOSE SERPL-MCNC: 92 MG/DL (ref 65–100)
HDLC SERPL-MCNC: 45 MG/DL (ref 40–60)
HDLC SERPL: 2.5 {RATIO}
LDLC SERPL CALC-MCNC: 53 MG/DL
LIPID PROFILE,FLP: NORMAL
POTASSIUM SERPL-SCNC: 3.8 MMOL/L (ref 3.5–5.1)
PROT SERPL-MCNC: 7.2 G/DL (ref 6.3–8.2)
SODIUM SERPL-SCNC: 145 MMOL/L (ref 136–145)
TRIGL SERPL-MCNC: 70 MG/DL (ref 35–150)
VLDLC SERPL CALC-MCNC: 14 MG/DL (ref 6–23)

## 2019-11-13 PROCEDURE — 80053 COMPREHEN METABOLIC PANEL: CPT

## 2019-11-13 PROCEDURE — 36415 COLL VENOUS BLD VENIPUNCTURE: CPT

## 2019-11-13 PROCEDURE — 80061 LIPID PANEL: CPT

## 2019-12-20 PROBLEM — N52.8 OTHER MALE ERECTILE DYSFUNCTION: Status: ACTIVE | Noted: 2019-12-20

## 2020-11-11 ENCOUNTER — HOSPITAL ENCOUNTER (OUTPATIENT)
Dept: LAB | Age: 40
Discharge: HOME | End: 2020-11-11

## 2020-11-11 DIAGNOSIS — E78.2 MIXED HYPERLIPIDEMIA: ICD-10-CM

## 2020-11-11 LAB
ALBUMIN SERPL-MCNC: 3.9 G/DL (ref 3.5–5)
ALBUMIN/GLOB SERPL: 1.1 {RATIO} (ref 1.2–3.5)
ALP SERPL-CCNC: 85 U/L (ref 50–136)
ALT SERPL-CCNC: 37 U/L (ref 12–65)
ANION GAP SERPL CALC-SCNC: 2 MMOL/L (ref 7–16)
AST SERPL-CCNC: 17 U/L (ref 15–37)
BILIRUB SERPL-MCNC: 0.5 MG/DL (ref 0.2–1.1)
BUN SERPL-MCNC: 15 MG/DL (ref 6–23)
CALCIUM SERPL-MCNC: 9 MG/DL (ref 8.3–10.4)
CHLORIDE SERPL-SCNC: 111 MMOL/L (ref 98–107)
CHOLEST SERPL-MCNC: 138 MG/DL
CO2 SERPL-SCNC: 27 MMOL/L (ref 21–32)
CREAT SERPL-MCNC: 1.08 MG/DL (ref 0.8–1.5)
GLOBULIN SER CALC-MCNC: 3.6 G/DL (ref 2.3–3.5)
GLUCOSE SERPL-MCNC: 97 MG/DL (ref 65–100)
HDLC SERPL-MCNC: 43 MG/DL (ref 40–60)
HDLC SERPL: 3.2 {RATIO}
LDLC SERPL CALC-MCNC: 77 MG/DL
LIPID PROFILE,FLP: NORMAL
POTASSIUM SERPL-SCNC: 4.4 MMOL/L (ref 3.5–5.1)
PROT SERPL-MCNC: 7.5 G/DL (ref 6.3–8.2)
SODIUM SERPL-SCNC: 140 MMOL/L (ref 138–145)
TRIGL SERPL-MCNC: 90 MG/DL (ref 35–150)
VLDLC SERPL CALC-MCNC: 18 MG/DL (ref 6–23)

## 2020-11-11 PROCEDURE — 80053 COMPREHEN METABOLIC PANEL: CPT

## 2020-11-11 PROCEDURE — 80061 LIPID PANEL: CPT

## 2020-11-11 PROCEDURE — 36415 COLL VENOUS BLD VENIPUNCTURE: CPT

## 2020-11-13 NOTE — PROGRESS NOTES
Per Dr. Kenneth Lowe, advised pt lab results acceptable, continue current medications. Call with any questions, concerns, or new/worsening cardiac symptoms. Pt verbalized understanding. See triage note.

## 2021-07-11 ENCOUNTER — HOSPITAL ENCOUNTER (EMERGENCY)
Age: 41
Discharge: HOME | End: 2021-07-11
Attending: STUDENT IN AN ORGANIZED HEALTH CARE EDUCATION/TRAINING PROGRAM

## 2021-07-11 VITALS
SYSTOLIC BLOOD PRESSURE: 136 MMHG | BODY MASS INDEX: 39.96 KG/M2 | RESPIRATION RATE: 16 BRPM | OXYGEN SATURATION: 98 % | DIASTOLIC BLOOD PRESSURE: 66 MMHG | TEMPERATURE: 97.9 F | HEART RATE: 87 BPM | WEIGHT: 295 LBS | HEIGHT: 72 IN

## 2021-07-11 DIAGNOSIS — S01.01XA LACERATION OF SCALP, INITIAL ENCOUNTER: Primary | ICD-10-CM

## 2021-07-11 DIAGNOSIS — S09.90XA CLOSED HEAD INJURY WITHOUT LOSS OF CONSCIOUSNESS, INITIAL ENCOUNTER: ICD-10-CM

## 2021-07-11 DIAGNOSIS — T14.8XXA ABRASION: ICD-10-CM

## 2021-07-11 PROCEDURE — 99284 EMERGENCY DEPT VISIT MOD MDM: CPT

## 2021-07-11 PROCEDURE — 75810000293 HC SIMP/SUPERF WND  RPR
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
## 2021-07-12 NOTE — ED NOTES
I have reviewed discharge instructions with the patient. The patient verbalized understanding. Patient left ED via Discharge Method: ambulatory to Home with significant other   Opportunity for questions and clarification provided. Patient given 0 scripts. To continue your aftercare when you leave the hospital, you may receive an automated call from our care team to check in on how you are doing. This is a free service and part of our promise to provide the best care and service to meet your aftercare needs.  If you have questions, or wish to unsubscribe from this service please call [phone]. Thank you for Choosing our [facility] Emergency Department.

## 2021-07-12 NOTE — DISCHARGE INSTRUCTIONS
Staples need to be removed in approximately 10 to 12 days. Keep the wound clean and dry and place antibiotic ointment or Vaseline on the area daily. Avoid getting the wound wet. Cleanse the abrasions daily. Please follow-up with the primary doctor. Return if you develop severely worsening or emergent symptoms such as severe headache or intractable vomiting or any other new or emergent concerns.
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
## 2021-07-22 ENCOUNTER — HOSPITAL ENCOUNTER (EMERGENCY)
Age: 41
Discharge: HOME | End: 2021-07-22
Attending: EMERGENCY MEDICINE

## 2021-07-22 VITALS
SYSTOLIC BLOOD PRESSURE: 125 MMHG | OXYGEN SATURATION: 97 % | HEART RATE: 75 BPM | DIASTOLIC BLOOD PRESSURE: 89 MMHG | RESPIRATION RATE: 16 BRPM | TEMPERATURE: 97.7 F

## 2021-07-22 DIAGNOSIS — Z48.02 VISIT FOR SUTURE REMOVAL: Primary | ICD-10-CM

## 2021-07-22 PROCEDURE — 75810000275 HC EMERGENCY DEPT VISIT NO LEVEL OF CARE

## 2021-07-22 NOTE — ED PROVIDER NOTES
19-year-old male presents for suture removal.  Sutures were placed to 6 days ago. He has had an unremarkable healing course. He has no new medical complaints. Past Medical History:   Diagnosis Date    HTN (hypertension) 2018    NSTEMI (non-ST elevated myocardial infarction) ([facility] [address] 2018       No past surgical history on file. Family History:   Problem Relation Age of Onset    Heart Disease Paternal Aunt     Heart Disease Paternal Uncle     Heart Disease Maternal Grandmother     Heart Disease Paternal Grandmother        Social History     Socioeconomic History    Marital status: SINGLE     Spouse name: Not on file    Number of children: Not on file    Years of education: Not on file    Highest education level: Not on file   Occupational History    Not on file   Tobacco Use    Smoking status: Current Some Day Smoker     Last attempt to quit: 2/3/2019     Years since quittin.4    Smokeless tobacco: Never Used    Tobacco comment: smokes marijuana   Substance and Sexual Activity    Alcohol use: No    Drug use: Yes     Types: Marijuana     Comment: states he does smoke daily    Sexual activity: Not Currently   Other Topics Concern     Service Not Asked    Blood Transfusions Not Asked    Caffeine Concern Not Asked    Occupational Exposure Not Asked    Hobby Hazards Not Asked    Sleep Concern Not Asked    Stress Concern Not Asked    Weight Concern Not Asked    Special Diet Not Asked    Back Care Not Asked    Exercise Not Asked    Bike Helmet Not Asked    [address] Not Asked    Self-Exams Not Asked   Social History Narrative    Not on file     Social Determinants of Health     Financial Resource Strain:     Difficulty of Paying Living Expenses:    Food Insecurity:     Worried About Running Out of Food in the Last Year:     Ran Out of Food in the Last Year:    Transportation Needs:     Lack of Transportation (Medical):      Lack of Transportation (Non-Medical):    Physical Activity:     Days of Exercise per Week:     Minutes of Exercise per Session:    Stress:     Feeling of Stress :    Social Connections:     Frequency of Communication with Friends and Family:     Frequency of Social Gatherings with Friends and Family:     Attends Mu-ism Services:     Active Member of Clubs or Organizations:     Attends Club or Organization Meetings:     Marital Status:    Intimate Partner Violence:     Fear of Current or Ex-Partner:     Emotionally Abused:     Physically Abused:     Sexually Abused: ALLERGIES: Metoprolol    Review of Systems   Constitutional: Negative for chills and fever. HENT: Negative for facial swelling. Respiratory: Negative for chest tightness and shortness of breath. Cardiovascular: Negative for chest pain. Gastrointestinal: Negative for abdominal pain, nausea and vomiting. Musculoskeletal: Negative for myalgias. Skin: Positive for wound. Neurological: Negative for headaches. Psychiatric/Behavioral: Negative for confusion. All other systems reviewed and are negative. There were no vitals filed for this visit. Physical Exam  Constitutional:       Appearance: Normal appearance. HENT:      Head: Normocephalic and atraumatic. Mouth/Throat:      Mouth: Mucous membranes are moist.   Eyes:      Pupils: Pupils are equal, round, and reactive to light. Cardiovascular:      Rate and Rhythm: Normal rate and regular rhythm. Pulmonary:      Effort: No respiratory distress. Breath sounds: Normal breath sounds. Abdominal:      Palpations: Abdomen is soft. Tenderness: There is no abdominal tenderness. There is no guarding. Skin:     General: Skin is warm and dry. Neurological:      Mental Status: He is alert and oriented to person, place, and time. Mental status is at baseline.    Psychiatric:         Mood and Affect: Mood normal.          MDM  Number of Diagnoses or Management Options  Visit for suture removal  Diagnosis management comments: Sutures look good, good healing, sutures removed by nursing triage.     Risk of Complications, Morbidity, and/or Mortality  Presenting problems: minimal  Diagnostic procedures: minimal  Management options: minimal    Patient Progress  Patient progress: improved         Procedures

## 2021-07-22 NOTE — ED TRIAGE NOTES
Pt arrives via POV requesting staple removal from last Sunday. No swelling or abnormalities noted. Seen by EDUARDO Corona in triage.

## 2022-03-18 PROBLEM — F41.9 ANXIETY: Status: ACTIVE | Noted: 2019-06-21

## 2022-03-18 PROBLEM — E66.01 SEVERE OBESITY (HCC): Status: ACTIVE | Noted: 2019-01-07

## 2022-03-18 PROBLEM — N52.8 OTHER MALE ERECTILE DYSFUNCTION: Status: ACTIVE | Noted: 2019-12-20

## 2022-03-18 PROBLEM — E78.2 MIXED HYPERLIPIDEMIA: Status: ACTIVE | Noted: 2019-01-07

## 2022-03-19 PROBLEM — S09.90XA CLOSED HEAD INJURY WITHOUT LOSS OF CONSCIOUSNESS: Status: ACTIVE | Noted: 2021-07-11

## 2022-03-19 PROBLEM — I21.4 NSTEMI (NON-ST ELEVATED MYOCARDIAL INFARCTION) (HCC): Status: ACTIVE | Noted: 2018-12-28

## 2022-03-19 PROBLEM — I10 HTN (HYPERTENSION): Status: ACTIVE | Noted: 2018-12-28

## 2022-03-19 PROBLEM — F19.20 SUBSTANCE DEPENDENCY (HCC): Status: ACTIVE | Noted: 2018-12-28

## 2022-03-19 PROBLEM — S01.01XA SCALP LACERATION: Status: ACTIVE | Noted: 2021-07-11

## 2022-03-19 PROBLEM — I25.5 ISCHEMIC CARDIOMYOPATHY: Status: ACTIVE | Noted: 2019-02-19

## 2022-03-20 PROBLEM — I25.10 CORONARY ARTERY DISEASE INVOLVING NATIVE CORONARY ARTERY OF NATIVE HEART: Status: ACTIVE | Noted: 2019-01-07

## 2022-03-20 PROBLEM — T14.8XXA ABRASION: Status: ACTIVE | Noted: 2021-07-11

## 2022-07-13 ENCOUNTER — OFFICE VISIT (OUTPATIENT)
Dept: CARDIOLOGY CLINIC | Age: 42
End: 2022-07-13

## 2022-07-13 VITALS
BODY MASS INDEX: 39.18 KG/M2 | DIASTOLIC BLOOD PRESSURE: 90 MMHG | WEIGHT: 289.3 LBS | SYSTOLIC BLOOD PRESSURE: 118 MMHG | HEIGHT: 72 IN | HEART RATE: 78 BPM

## 2022-07-13 DIAGNOSIS — I25.10 CORONARY ARTERY DISEASE INVOLVING NATIVE CORONARY ARTERY OF NATIVE HEART WITHOUT ANGINA PECTORIS: ICD-10-CM

## 2022-07-13 DIAGNOSIS — E78.2 MIXED HYPERLIPIDEMIA: ICD-10-CM

## 2022-07-13 DIAGNOSIS — I10 PRIMARY HYPERTENSION: Primary | ICD-10-CM

## 2022-07-13 DIAGNOSIS — E66.01 SEVERE OBESITY (HCC): ICD-10-CM

## 2022-07-13 DIAGNOSIS — N52.8 OTHER MALE ERECTILE DYSFUNCTION: ICD-10-CM

## 2022-07-13 PROCEDURE — 93000 ELECTROCARDIOGRAM COMPLETE: CPT | Performed by: INTERNAL MEDICINE

## 2022-07-13 PROCEDURE — 99214 OFFICE O/P EST MOD 30 MIN: CPT | Performed by: INTERNAL MEDICINE

## 2022-07-13 RX ORDER — SILDENAFIL CITRATE 20 MG/1
TABLET ORAL
Qty: 30 TABLET | Refills: 2 | Status: SHIPPED | OUTPATIENT
Start: 2022-07-13

## 2022-07-13 ASSESSMENT — ENCOUNTER SYMPTOMS: SHORTNESS OF BREATH: 0

## 2022-07-13 NOTE — PROGRESS NOTES
[address], [address] [place] [address], [address] [place] [address]  PHONE: [phone]    Nohemi Correa.  1980      SUBJECTIVE:   Nohemi Nesbitt is a 38 y.o. male seen for a follow up visit regarding the following:     Chief Complaint   Patient presents with    Hypertension    Cardiomyopathy       HPI:    Nohemi Nesbitt presents for routine follow up. Multiple issues addressed. Coronary Artery Disease:  Patient denies any recent angina. Notes compliance with medical therapy. No recent NTG use. No intolerance to anti-platelet therapy. Hypertension:  Ambulatory BP readings have been controlled. Patient reports compliance with medical therapy without side effects. Hyperlipidemia:   Patient compliant and tolerating Lipitor. Seen in free clinic. Tchol 138,  LDL 88, HDL 37, Tri 67    Exercise: Working driving . Tobacco:  1 marijuana cigarette a day. Erectile dysfunction:  Infrequent use of viagra. Past Medical History, Past Surgical History, Family history, Social History, and Medications were all reviewed with the patient today and updated as necessary. Current Outpatient Medications:     ALPRAZolam (XANAX) 0.5 MG tablet, Take by mouth as needed. , Disp: , Rfl:     aspirin 81 MG chewable tablet, Take 81 mg by mouth daily, Disp: , Rfl:     atorvastatin (LIPITOR) 40 MG tablet, Take 40 mg by mouth, Disp: , Rfl:     buPROPion (WELLBUTRIN SR) 150 MG extended release tablet, Take by mouth 2 times daily, Disp: , Rfl:     Cholecalciferol 50 MCG (2000 UT) TABS, Take by mouth, Disp: , Rfl:     lisinopril (PRINIVIL;ZESTRIL) 10 MG tablet, Take 10 mg by mouth daily, Disp: , Rfl:     nitroGLYCERIN (NITROSTAT) 0.4 MG SL tablet, Place 0.4 mg under the tongue, Disp: , Rfl:     omeprazole (PRILOSEC OTC) 20 MG tablet, Take 20 mg by mouth daily, Disp: , Rfl:     sildenafil (REVATIO) 20 MG tablet, Take 2-3 tablets as needed. , Disp: , Rfl:      Allergies   Allergen Reactions    Metoprolol Other (See Comments)     Impotence. Patient Active Problem List    Diagnosis Date Noted    Closed head injury without loss of consciousness 07/11/2021    Scalp laceration 07/11/2021    Abrasion 07/11/2021    Other male erectile dysfunction 12/20/2019     Improved off Lopressor.  Anxiety 06/21/2019    Ischemic cardiomyopathy 02/19/2019     1. Echo (12/28/18)  EF 45-50%. Distal anterior and apical hypokinesis. 2.  Echo (12/9/19):  EF 50-55%. No significant valvular disease.  Severe obesity ([address] 01/07/2019    Mixed hyperlipidemia 01/07/2019    Coronary artery disease involving native coronary artery of native heart 01/07/2019     1. NSTEMI (12/28/18):  Peak troponin 5. PCI of pLAD with Synergy 4 x 24   mm MARLON post dilate proximally to 5 mm. Post stent IVUS. Mild residual   disease. EF 50%.  Substance dependency ([facility] [address] 12/28/2018    NSTEMI (non-ST elevated myocardial infarction) ([facility] [address] 12/28/2018    HTN (hypertension) 12/28/2018        Social History     Tobacco Use    Smoking status: Current Some Day Smoker     Last attempt to quit: 2/3/2019     Years since quitting: 3.4    Smokeless tobacco: Never Used    Tobacco comment: Quit smoking: smokes marijuana   Substance Use Topics    Alcohol use: No       ROS:    Review of Systems   Constitutional: Negative for malaise/fatigue. Cardiovascular: Negative for chest pain. Respiratory: Negative for shortness of breath. Musculoskeletal: Positive for arthritis. Neurological: Negative for focal weakness. Psychiatric/Behavioral: Negative for depression.            PHYSICAL EXAM:  Wt Readings from Last 3 Encounters:   07/13/22 289 lb 4.8 oz (131.2 kg)   08/02/21 290 lb 9.6 oz (131.8 kg)     BP Readings from Last 3 Encounters:   07/13/22 (!) 120/90   08/02/21 120/72     Pulse Readings from Last 3 Encounters:   08/02/21 80       Physical Exam  Constitutional: General: He is not in acute distress. Neck:      Vascular: No carotid bruit. Cardiovascular:      Rate and Rhythm: Normal rate and regular rhythm. Pulmonary:      Effort: No respiratory distress. Breath sounds: Normal breath sounds. Abdominal:      General: Bowel sounds are normal.      Palpations: Abdomen is soft. Musculoskeletal:         General: No swelling. Skin:     General: Skin is warm and dry. Neurological:      General: No focal deficit present. Psychiatric:         Mood and Affect: Mood normal.         Medical problems and test results were reviewed with the patient today. Lab Results   Component Value Date    WBC 10.4 07/03/2019    HGB 16.1 07/03/2019    HCT 47.1 07/03/2019    MCV 95.2 07/03/2019     07/03/2019       Lab Results   Component Value Date/Time     11/11/2020 12:42 PM    K 4.4 11/11/2020 12:42 PM     11/11/2020 12:42 PM    CO2 27 11/11/2020 12:42 PM    BUN 15 11/11/2020 12:42 PM    CREATININE 1.08 11/11/2020 12:42 PM    GLUCOSE 97 11/11/2020 12:42 PM    CALCIUM 9.0 11/11/2020 12:42 PM        Lab Results   Component Value Date    CHOL 138 11/11/2020     Lab Results   Component Value Date    TRIG 90 11/11/2020     Lab Results   Component Value Date    HDL 43 11/11/2020     Lab Results   Component Value Date    LDLCALC 77 11/11/2020     Lab Results   Component Value Date    LABVLDL 18 11/11/2020     Lab Results   Component Value Date    CHOLHDLRATIO 3.2 11/11/2020        Data from outside records/labs from outside providers have been reviewed and summarized as noted in the HPI, past history and data review sections of this note     EKG done today and reviewed with patient showed:  Sinus  Rhythm   Normal axis  Rate 78    ASSESSMENT and PLAN      1. Primary hypertension  Currently BP appears to be under acceptable control on current therapy. Continue current medications including Lisinopril.    Discussed monitoring ambulatory BP readings to ensure continued optimal management. If BP becomes elevated, patient is encouraged to contact my office for further titration of therapy. - EKG 12 Lead    2. Coronary artery disease involving native coronary artery of native heart without angina pectoris  Patient is doing well without any anginal symptoms. Continue Aspirin 81 mg a day and PRN NTG. We discussed the importance of continued risk factor modification. The patient is encouraged to contact my office with any worsening dyspnea or chest discomfort. 3. Mixed hyperlipidemia  Lipids at goal.  Continue therapy with Lipitor      4. Severe obesity ([address]  Encouraged weight loss and lifestyle modification. 5.  Erectile dysfunction  Refill of viagra given. Discussed the interaction with nitroglycerin. Soraya Navarro MD  7/13/2022  8:19 AM    This note may have been dictated using speech recognition software.   As a result, error of speech recognition may have occurred

## 2023-05-04 ENCOUNTER — OFFICE VISIT (OUTPATIENT)
Dept: CARDIOLOGY CLINIC | Age: 43
End: 2023-05-04

## 2023-05-04 VITALS
BODY MASS INDEX: 39.22 KG/M2 | SYSTOLIC BLOOD PRESSURE: 118 MMHG | DIASTOLIC BLOOD PRESSURE: 86 MMHG | WEIGHT: 289.6 LBS | HEIGHT: 72 IN | HEART RATE: 84 BPM

## 2023-05-04 DIAGNOSIS — I10 PRIMARY HYPERTENSION: ICD-10-CM

## 2023-05-04 DIAGNOSIS — I25.5 ISCHEMIC CARDIOMYOPATHY: ICD-10-CM

## 2023-05-04 DIAGNOSIS — E78.2 MIXED HYPERLIPIDEMIA: ICD-10-CM

## 2023-05-04 DIAGNOSIS — I25.119 CORONARY ARTERY DISEASE INVOLVING NATIVE CORONARY ARTERY OF NATIVE HEART WITH ANGINA PECTORIS (HCC): Primary | ICD-10-CM

## 2023-05-04 PROCEDURE — 99214 OFFICE O/P EST MOD 30 MIN: CPT | Performed by: INTERNAL MEDICINE

## 2023-05-04 PROCEDURE — 3074F SYST BP LT 130 MM HG: CPT | Performed by: INTERNAL MEDICINE

## 2023-05-04 PROCEDURE — 3079F DIAST BP 80-89 MM HG: CPT | Performed by: INTERNAL MEDICINE

## 2023-05-04 RX ORDER — SILDENAFIL CITRATE 20 MG/1
TABLET ORAL
Qty: 30 TABLET | Refills: 2 | Status: SHIPPED | OUTPATIENT
Start: 2023-05-04

## 2023-05-04 RX ORDER — FAMOTIDINE 20 MG/1
20 TABLET, FILM COATED ORAL DAILY
COMMUNITY

## 2023-05-04 ASSESSMENT — ENCOUNTER SYMPTOMS: SHORTNESS OF BREATH: 0

## 2023-05-04 NOTE — PROGRESS NOTES
Comments)     Impotence. Patient Active Problem List    Diagnosis Date Noted    Closed head injury without loss of consciousness 2021     Priority: Low    Scalp laceration 2021     Priority: Low    Abrasion 2021     Priority: Low    Other male erectile dysfunction 2019     Priority: Low     Improved off Lopressor. Anxiety 2019     Priority: Low    Ischemic cardiomyopathy 2019     Priority: Low     1.    Echo (18)  EF 45-50%. Distal anterior and apical hypokinesis. 2.  Echo (19):  EF 50-55%. No significant valvular disease. Severe obesity ([address] 2019     Priority: Low    Mixed hyperlipidemia 2019     Priority: Low    Coronary artery disease involving native coronary artery of native heart 2019     Priority: Low     1. NSTEMI (18):  Peak troponin 5. PCI of pLAD with Synergy 4 x 24   mm MARLON post dilate proximally to 5 mm. Post stent IVUS. Mild residual   disease. EF 50%. Substance dependency ([address] 2018     Priority: Low    NSTEMI (non-ST elevated myocardial infarction) ([address] 2018     Priority: Low    HTN (hypertension) 2018     Priority: Low        Social History     Tobacco Use    Smoking status: Former     Types: Cigarettes     Quit date: 3/1/2023     Years since quittin.1     Passive exposure: Past    Smokeless tobacco: Never    Tobacco comments:     Quit smoking: smokes marijuana   Substance Use Topics    Alcohol use: No       ROS:    Review of Systems   Constitutional: Negative for malaise/fatigue. Cardiovascular:  Positive for chest pain. Respiratory:  Negative for shortness of breath. Musculoskeletal:  Positive for arthritis. Neurological:  Negative for focal weakness. Psychiatric/Behavioral:  Negative for depression.           PHYSICAL EXAM:  Wt Readings from Last 3 Encounters:   23 289 lb 9.6 oz (131.4 kg)   22 289 lb 4.8 oz (131.2 kg)   21 290 lb 9.6

## 2023-05-10 ENCOUNTER — HOSPITAL ENCOUNTER (OUTPATIENT)
Age: 43
Setting detail: OUTPATIENT SURGERY
Discharge: HOME | End: 2023-05-10
Attending: INTERNAL MEDICINE | Admitting: INTERNAL MEDICINE

## 2023-05-10 VITALS
RESPIRATION RATE: 11 BRPM | OXYGEN SATURATION: 98 % | HEART RATE: 76 BPM | DIASTOLIC BLOOD PRESSURE: 85 MMHG | TEMPERATURE: 98 F | HEIGHT: 72 IN | BODY MASS INDEX: 39.14 KG/M2 | WEIGHT: 289 LBS | SYSTOLIC BLOOD PRESSURE: 108 MMHG

## 2023-05-10 DIAGNOSIS — I20.0 ACCELERATING ANGINA (HCC): Primary | ICD-10-CM

## 2023-05-10 DIAGNOSIS — I25.10 CAD (CORONARY ARTERY DISEASE): ICD-10-CM

## 2023-05-10 LAB
ANION GAP SERPL CALC-SCNC: 0 MMOL/L (ref 2–11)
BASOPHILS # BLD: 0 K/UL (ref 0–0.2)
BASOPHILS NFR BLD: 0 % (ref 0–2)
BUN SERPL-MCNC: 12 MG/DL (ref 6–23)
CALCIUM SERPL-MCNC: 9.1 MG/DL (ref 8.3–10.4)
CHLORIDE SERPL-SCNC: 110 MMOL/L (ref 101–110)
CO2 SERPL-SCNC: 26 MMOL/L (ref 21–32)
CREAT SERPL-MCNC: 1 MG/DL (ref 0.8–1.5)
DIFFERENTIAL METHOD BLD: ABNORMAL
ECHO BSA: 2.58 M2
EKG ATRIAL RATE: 93 BPM
EKG DIAGNOSIS: NORMAL
EKG P AXIS: 52 DEGREES
EKG P-R INTERVAL: 148 MS
EKG Q-T INTERVAL: 334 MS
EKG QRS DURATION: 78 MS
EKG QTC CALCULATION (BAZETT): 415 MS
EKG R AXIS: -22 DEGREES
EKG T AXIS: 16 DEGREES
EKG VENTRICULAR RATE: 93 BPM
EOSINOPHIL # BLD: 0 K/UL (ref 0–0.8)
EOSINOPHIL NFR BLD: 1 % (ref 0.5–7.8)
ERYTHROCYTE [DISTWIDTH] IN BLOOD BY AUTOMATED COUNT: 12.1 % (ref 11.9–14.6)
GLUCOSE SERPL-MCNC: 107 MG/DL (ref 65–100)
HCT VFR BLD AUTO: 46.3 % (ref 41.1–50.3)
HGB BLD-MCNC: 16.2 G/DL (ref 13.6–17.2)
IMM GRANULOCYTES # BLD AUTO: 0 K/UL (ref 0–0.5)
IMM GRANULOCYTES NFR BLD AUTO: 0 % (ref 0–5)
LYMPHOCYTES # BLD: 2 K/UL (ref 0.5–4.6)
LYMPHOCYTES NFR BLD: 30 % (ref 13–44)
MCH RBC QN AUTO: 32.4 PG (ref 26.1–32.9)
MCHC RBC AUTO-ENTMCNC: 35 G/DL (ref 31.4–35)
MCV RBC AUTO: 92.6 FL (ref 82–102)
MONOCYTES # BLD: 0.5 K/UL (ref 0.1–1.3)
MONOCYTES NFR BLD: 8 % (ref 4–12)
NEUTS SEG # BLD: 4 K/UL (ref 1.7–8.2)
NEUTS SEG NFR BLD: 61 % (ref 43–78)
NRBC # BLD: 0 K/UL (ref 0–0.2)
PLATELET # BLD AUTO: 232 K/UL (ref 150–450)
PMV BLD AUTO: 8.7 FL (ref 9.4–12.3)
POTASSIUM SERPL-SCNC: 4.4 MMOL/L (ref 3.5–5.1)
RBC # BLD AUTO: 5 M/UL (ref 4.23–5.6)
SODIUM SERPL-SCNC: 136 MMOL/L (ref 133–143)
WBC # BLD AUTO: 6.5 K/UL (ref 4.3–11.1)

## 2023-05-10 PROCEDURE — C1894 INTRO/SHEATH, NON-LASER: HCPCS | Performed by: INTERNAL MEDICINE

## 2023-05-10 PROCEDURE — 85025 COMPLETE CBC W/AUTO DIFF WBC: CPT

## 2023-05-10 PROCEDURE — 99152 MOD SED SAME PHYS/QHP 5/>YRS: CPT | Performed by: INTERNAL MEDICINE

## 2023-05-10 PROCEDURE — 6370000000 HC RX 637 (ALT 250 FOR IP): Performed by: INTERNAL MEDICINE

## 2023-05-10 PROCEDURE — 6360000004 HC RX CONTRAST MEDICATION: Performed by: INTERNAL MEDICINE

## 2023-05-10 PROCEDURE — 80048 BASIC METABOLIC PNL TOTAL CA: CPT

## 2023-05-10 PROCEDURE — 6360000002 HC RX W HCPCS: Performed by: INTERNAL MEDICINE

## 2023-05-10 PROCEDURE — 2500000003 HC RX 250 WO HCPCS: Performed by: INTERNAL MEDICINE

## 2023-05-10 PROCEDURE — 93005 ELECTROCARDIOGRAM TRACING: CPT | Performed by: INTERNAL MEDICINE

## 2023-05-10 PROCEDURE — C1769 GUIDE WIRE: HCPCS | Performed by: INTERNAL MEDICINE

## 2023-05-10 PROCEDURE — 2709999900 HC NON-CHARGEABLE SUPPLY: Performed by: INTERNAL MEDICINE

## 2023-05-10 PROCEDURE — 93458 L HRT ARTERY/VENTRICLE ANGIO: CPT | Performed by: INTERNAL MEDICINE

## 2023-05-10 RX ORDER — HYDRALAZINE HYDROCHLORIDE 20 MG/ML
10 INJECTION INTRAMUSCULAR; INTRAVENOUS EVERY 10 MIN PRN
Status: CANCELLED | OUTPATIENT
Start: 2023-05-10

## 2023-05-10 RX ORDER — HEPARIN SODIUM 200 [USP'U]/100ML
INJECTION, SOLUTION INTRAVENOUS CONTINUOUS PRN
Status: DISCONTINUED | OUTPATIENT
Start: 2023-05-10 | End: 2023-05-10 | Stop reason: HOSPADM

## 2023-05-10 RX ORDER — OMEPRAZOLE 40 MG/1
40 CAPSULE, DELAYED RELEASE ORAL
Qty: 30 CAPSULE | Refills: 5 | Status: SHIPPED | OUTPATIENT
Start: 2023-05-10

## 2023-05-10 RX ORDER — ASPIRIN 81 MG/1
324 TABLET, CHEWABLE ORAL ONCE
Status: COMPLETED | OUTPATIENT
Start: 2023-05-10 | End: 2023-05-10

## 2023-05-10 RX ORDER — LIDOCAINE HYDROCHLORIDE 10 MG/ML
INJECTION, SOLUTION INFILTRATION; PERINEURAL PRN
Status: DISCONTINUED | OUTPATIENT
Start: 2023-05-10 | End: 2023-05-10 | Stop reason: HOSPADM

## 2023-05-10 RX ORDER — DIAZEPAM 5 MG/1
5 TABLET ORAL ONCE
Status: COMPLETED | OUTPATIENT
Start: 2023-05-10 | End: 2023-05-10

## 2023-05-10 RX ORDER — MIDAZOLAM HYDROCHLORIDE 1 MG/ML
INJECTION INTRAMUSCULAR; INTRAVENOUS PRN
Status: DISCONTINUED | OUTPATIENT
Start: 2023-05-10 | End: 2023-05-10 | Stop reason: HOSPADM

## 2023-05-10 RX ORDER — ACETAMINOPHEN 325 MG/1
650 TABLET ORAL EVERY 4 HOURS PRN
Status: CANCELLED | OUTPATIENT
Start: 2023-05-10

## 2023-05-10 RX ORDER — SODIUM CHLORIDE 0.9 % (FLUSH) 0.9 %
5-40 SYRINGE (ML) INJECTION EVERY 12 HOURS SCHEDULED
Status: CANCELLED | OUTPATIENT
Start: 2023-05-10

## 2023-05-10 RX ORDER — SODIUM CHLORIDE 9 MG/ML
INJECTION, SOLUTION INTRAVENOUS CONTINUOUS
Status: DISCONTINUED | OUTPATIENT
Start: 2023-05-10 | End: 2023-05-10 | Stop reason: HOSPADM

## 2023-05-10 RX ORDER — ONDANSETRON 2 MG/ML
4 INJECTION INTRAMUSCULAR; INTRAVENOUS EVERY 6 HOURS PRN
Status: CANCELLED | OUTPATIENT
Start: 2023-05-10

## 2023-05-10 RX ORDER — SODIUM CHLORIDE 9 MG/ML
INJECTION, SOLUTION INTRAVENOUS PRN
Status: CANCELLED | OUTPATIENT
Start: 2023-05-10

## 2023-05-10 RX ADMIN — DIAZEPAM 5 MG: 5 TABLET ORAL at 11:12

## 2023-05-10 RX ADMIN — ASPIRIN 81 MG 324 MG: 81 TABLET ORAL at 10:36

## 2023-05-10 NOTE — PROGRESS NOTES
TRANSFER - OUT REPORT:    Verbal report given to RN on [place].  being transferred to [facility] for routine progression of patient care       Report consisted of patient's Situation, Background, Assessment and   Recommendations(SBAR). Information from the following report(s) Nurse Handoff Report and MAR was reviewed with the receiving nurse.           [facility] w/ Dr. Jodie Caceres  No interventions  R radial access  TR band to R radial @ 12mL  No s/sxs of bleeding or hematoma to R radial access site    Heparin 5,000 units IC  Versed 4mg IV  Fentanyl 50mcg IV

## 2023-05-10 NOTE — DISCHARGE INSTRUCTIONS
HEART CATHETERIZATION/ANGIOGRAPHY DISCHARGE INSTRUCTIONS    Check puncture site frequently for swelling or bleeding. If there is any bleeding, apply pressure over the area with a clean towel or washcloth and call 911. Notify your doctor for any redness, swelling, drainage, or oozing from the puncture site. Notify your doctor for any fever or chills. If the extremity becomes cold, numb, or painful call Dr. HANDLEY [facility] at [phone]. Activity should be limited for the next 48 hours. No heavy lifting, pushing, pulling  or strenuous activity for 48 hours. No heavy lifting (anything over 10 pounds) for 3 days. You may resume your usual diet. Drink more fluids than usual.  Have a responsible person drive you home and stay with you for at least 24 hours after your heart catheterization/angiography. You may remove bandage from your right wrist in 24 hours. You may shower in 24 hours. No tub baths, hot tubs, or swimming for 1 week. Do not place any lotions, creams, powders, or ointments over puncture site for 1 week. You may place a clean band-aid over the puncture site each day for 5 days. Change daily. Sedation for a Medical Procedure: Care Instructions     You were given a sedative medication during your visit. While many of the effects will have worn   off before you leave; you may continue to feel some effects for several hours. Common side effects from sedation include:  Feeling sleepy. (Your doctors and nurses will make sure you are not too sleepy to go home.)  Nausea and vomiting. This usually does not last long. Feeling tired. How can you care for yourself at home? Activity    Don't do anything for 24 hours that requires attention to detail. It takes time for the medicine effects to completely wear off. Do not make important legal decisions for 24 hours. Do not sign any legal documents for 24 hours.      Do not drink alcohol today     For your safety, you should not drive or

## 2023-05-10 NOTE — PROGRESS NOTES
Report received from [facility]. Procedural finding communicated. Intra procedural medication administration reviewed. Progression of care discussed. Patient received into CPRU room 6, Post LHC    Access site without bleeding or swelling. Right wrist    Patient instructed to limit movement of right upper extremity. Routine post procedural vital signs & site assessment initiated.

## 2023-05-10 NOTE — PROGRESS NOTES
R band deflation completed. right radial dressed with quick clot and tegaderm using sterile technique. No bleeding or hematoma. Tolerated without difficultyDischarge instructions given per orders, voiced good understanding of meds & follow up care. Denies any questions.

## 2023-05-24 ENCOUNTER — OFFICE VISIT (OUTPATIENT)
Age: 43
End: 2023-05-24

## 2023-05-24 VITALS
SYSTOLIC BLOOD PRESSURE: 132 MMHG | DIASTOLIC BLOOD PRESSURE: 88 MMHG | WEIGHT: 289.6 LBS | HEIGHT: 72 IN | BODY MASS INDEX: 39.22 KG/M2 | HEART RATE: 80 BPM

## 2023-05-24 DIAGNOSIS — I10 PRIMARY HYPERTENSION: ICD-10-CM

## 2023-05-24 DIAGNOSIS — I25.10 CORONARY ARTERY DISEASE INVOLVING NATIVE CORONARY ARTERY OF NATIVE HEART WITHOUT ANGINA PECTORIS: Primary | ICD-10-CM

## 2023-05-24 DIAGNOSIS — E78.2 MIXED HYPERLIPIDEMIA: ICD-10-CM

## 2023-05-24 DIAGNOSIS — K21.9 GASTROESOPHAGEAL REFLUX DISEASE WITHOUT ESOPHAGITIS: ICD-10-CM

## 2023-05-24 PROBLEM — I21.4 NSTEMI (NON-ST ELEVATED MYOCARDIAL INFARCTION) (HCC): Status: RESOLVED | Noted: 2018-12-28 | Resolved: 2023-05-24

## 2023-05-24 PROCEDURE — 3079F DIAST BP 80-89 MM HG: CPT | Performed by: INTERNAL MEDICINE

## 2023-05-24 PROCEDURE — 3075F SYST BP GE 130 - 139MM HG: CPT | Performed by: INTERNAL MEDICINE

## 2023-05-24 PROCEDURE — 99214 OFFICE O/P EST MOD 30 MIN: CPT | Performed by: INTERNAL MEDICINE

## 2023-05-24 ASSESSMENT — ENCOUNTER SYMPTOMS: SHORTNESS OF BREATH: 0

## 2023-05-24 NOTE — PROGRESS NOTES
[address] [place], [place]  [address], [place]  [address]  PHONE: [phone]    Yue Finley.  1980      SUBJECTIVE:   Yue Miles is a 41 y.o. male seen for a follow up visit regarding the following:     Chief Complaint   Patient presents with    Hypertension    Coronary Artery Disease       HPI:    Patient presents for follow-up after recent cardiac catheterization. This showed findings as below:    [facility] (5/10/23): Patent LAD stent. Mild residual nonobstructive disease. EF 55-60%. Was started on omeprazole therapy and has had significant relief of his symptoms. Unfortunately he is awaiting sentencing from a federal offense and feels that he will likely be incarcerated between 5 to 10 years. Past Medical History, Past Surgical History, Family history, Social History, and Medications were all reviewed with the patient today and updated as necessary. Current Outpatient Medications:     omeprazole (PRILOSEC) 40 MG delayed release capsule, Take 1 capsule by mouth every morning (before breakfast), Disp: 30 capsule, Rfl: 5    sildenafil (REVATIO) 20 MG tablet, Take 2-3 tablets as needed. , Disp: 30 tablet, Rfl: 2    ALPRAZolam (XANAX) 0.5 MG tablet, Take by mouth as needed. , Disp: , Rfl:     aspirin 81 MG chewable tablet, Take 1 tablet by mouth daily, Disp: , Rfl:     atorvastatin (LIPITOR) 40 MG tablet, Take 1 tablet by mouth, Disp: , Rfl:     buPROPion (WELLBUTRIN SR) 150 MG extended release tablet, Take by mouth daily, Disp: , Rfl:     Cholecalciferol 50 MCG (2000 UT) TABS, Take by mouth, Disp: , Rfl:     lisinopril (PRINIVIL;ZESTRIL) 10 MG tablet, Take 1 tablet by mouth daily, Disp: , Rfl:     nitroGLYCERIN (NITROSTAT) 0.4 MG SL tablet, Place 1 tablet under the tongue, Disp: , Rfl:      Allergies   Allergen Reactions    Metoprolol Other (See Comments)     Impotence.          Patient Active Problem List    Diagnosis Date Noted    Closed head injury

## 2023-07-11 ENCOUNTER — TELEPHONE (OUTPATIENT)
Age: 43
End: 2023-07-11

## 2023-07-11 RX ORDER — SILDENAFIL CITRATE 20 MG/1
TABLET ORAL
Qty: 30 TABLET | Refills: 2 | Status: SHIPPED | OUTPATIENT
Start: 2023-07-11

## 2023-07-11 NOTE — TELEPHONE ENCOUNTER
MEDICATION REFILL REQUEST      Name of Medication:  Sildenafil  Dose:  20 mg  Frequency:  ?  Quantity:  ?  Days' supply:  ?       Pharmacy Name/Location:  call pt he did not leave a pharmacy

## 2023-07-11 NOTE — TELEPHONE ENCOUNTER
Prescription sent to pharmacy on file//[email or web address]  Requested Prescriptions     Signed Prescriptions Disp Refills    sildenafil (REVATIO) 20 MG tablet 30 tablet 2     Sig: Take 2-3 tablets as needed.      Authorizing Provider: Ming Robles     Ordering User: Queenie Sykes

## 2023-07-31 ENCOUNTER — TELEPHONE (OUTPATIENT)
Age: 43
End: 2023-07-31

## 2023-07-31 NOTE — TELEPHONE ENCOUNTER
Dr Keiko Barraza wanted him to call and talk to him before he goes away and let him know whats going on .  Also get a letter from Dr Keiko Barraza Please call patient

## 2023-07-31 NOTE — TELEPHONE ENCOUNTER
Patient called, wanting to know if he needs to be seen or if a letter could be written for him. Patient states that Dr. Keiko Barraza knows his situation. He will be going to court on 8-22, but will need letter this week. Patient informed that Dr Keiko Barraza would be informed of his request, call back with doctors response.  Patient voiced understanding//radha

## (undated) DEVICE — GUIDEWIRE 035IN 210CM PTFE COAT FIX COR J TIP 15MM FIRM BODY

## (undated) DEVICE — RADIFOCUS OPTITORQUE ANGIOGRAPHIC CATHETER: Brand: OPTITORQUE

## (undated) DEVICE — GLIDESHEATH SLENDER STAINLESS STEEL KIT: Brand: GLIDESHEATH SLENDER

## (undated) DEVICE — DEVICE COMPR LNG 27 CM VASC BND

## (undated) DEVICE — CATHETER COR DIAG PIGTAILS PIG 145 CRV 5FR 110CM 6 SIDE H